# Patient Record
Sex: FEMALE | URBAN - METROPOLITAN AREA
[De-identification: names, ages, dates, MRNs, and addresses within clinical notes are randomized per-mention and may not be internally consistent; named-entity substitution may affect disease eponyms.]

---

## 2020-11-27 ENCOUNTER — OFFICE VISIT (OUTPATIENT)
Dept: URGENT CARE | Facility: CLINIC | Age: 75
End: 2020-11-27
Payer: MEDICARE

## 2020-11-27 VITALS
DIASTOLIC BLOOD PRESSURE: 81 MMHG | HEIGHT: 65 IN | BODY MASS INDEX: 26.99 KG/M2 | HEART RATE: 76 BPM | SYSTOLIC BLOOD PRESSURE: 142 MMHG | WEIGHT: 162 LBS | OXYGEN SATURATION: 98 % | TEMPERATURE: 98.2 F | RESPIRATION RATE: 16 BRPM

## 2020-11-27 DIAGNOSIS — H00.015 HORDEOLUM EXTERNUM OF LEFT LOWER EYELID: Primary | ICD-10-CM

## 2020-11-27 PROCEDURE — 99203 OFFICE O/P NEW LOW 30 MIN: CPT | Performed by: PHYSICIAN ASSISTANT

## 2020-11-27 RX ORDER — POLYMYXIN B SULFATE AND TRIMETHOPRIM 1; 10000 MG/ML; [USP'U]/ML
1 SOLUTION OPHTHALMIC EVERY 4 HOURS
Qty: 10 ML | Refills: 0 | Status: SHIPPED | OUTPATIENT
Start: 2020-11-27

## 2020-11-27 RX ORDER — ESCITALOPRAM OXALATE 20 MG/1
30 TABLET ORAL DAILY
COMMUNITY

## 2021-07-06 ENCOUNTER — APPOINTMENT (OUTPATIENT)
Dept: OPHTHALMOLOGY | Facility: CLINIC | Age: 76
End: 2021-07-06
Payer: MEDICARE

## 2021-07-06 ENCOUNTER — NON-APPOINTMENT (OUTPATIENT)
Age: 76
End: 2021-07-06

## 2021-07-06 PROCEDURE — 92014 COMPRE OPH EXAM EST PT 1/>: CPT

## 2022-10-11 ENCOUNTER — APPOINTMENT (OUTPATIENT)
Dept: OPHTHALMOLOGY | Facility: CLINIC | Age: 77
End: 2022-10-11

## 2022-10-11 ENCOUNTER — NON-APPOINTMENT (OUTPATIENT)
Age: 77
End: 2022-10-11

## 2022-10-11 PROCEDURE — 92014 COMPRE OPH EXAM EST PT 1/>: CPT

## 2023-10-18 ENCOUNTER — APPOINTMENT (OUTPATIENT)
Dept: OPHTHALMOLOGY | Facility: CLINIC | Age: 78
End: 2023-10-18
Payer: MEDICARE

## 2023-10-18 ENCOUNTER — NON-APPOINTMENT (OUTPATIENT)
Age: 78
End: 2023-10-18

## 2023-10-18 PROCEDURE — 99214 OFFICE O/P EST MOD 30 MIN: CPT

## 2023-11-14 ENCOUNTER — NON-APPOINTMENT (OUTPATIENT)
Age: 78
End: 2023-11-14

## 2023-11-14 ENCOUNTER — APPOINTMENT (OUTPATIENT)
Dept: OPHTHALMOLOGY | Facility: CLINIC | Age: 78
End: 2023-11-14
Payer: MEDICARE

## 2023-11-14 PROCEDURE — 92134 CPTRZ OPH DX IMG PST SGM RTA: CPT

## 2023-11-14 PROCEDURE — 99213 OFFICE O/P EST LOW 20 MIN: CPT

## 2023-11-14 PROCEDURE — 92136 OPHTHALMIC BIOMETRY: CPT

## 2023-11-20 ENCOUNTER — APPOINTMENT (OUTPATIENT)
Dept: OPHTHALMOLOGY | Facility: AMBULATORY SURGERY CENTER | Age: 78
End: 2023-11-20
Payer: MEDICARE

## 2023-11-20 ENCOUNTER — NON-APPOINTMENT (OUTPATIENT)
Age: 78
End: 2023-11-20

## 2023-11-20 ENCOUNTER — APPOINTMENT (OUTPATIENT)
Dept: OPHTHALMOLOGY | Facility: CLINIC | Age: 78
End: 2023-11-20

## 2023-11-20 PROCEDURE — 66984 XCAPSL CTRC RMVL W/O ECP: CPT | Mod: RT

## 2023-11-20 PROCEDURE — CL050: CPT

## 2023-11-21 ENCOUNTER — APPOINTMENT (OUTPATIENT)
Dept: OPHTHALMOLOGY | Facility: CLINIC | Age: 78
End: 2023-11-21
Payer: MEDICARE

## 2023-11-21 ENCOUNTER — NON-APPOINTMENT (OUTPATIENT)
Age: 78
End: 2023-11-21

## 2023-11-21 PROCEDURE — 99024 POSTOP FOLLOW-UP VISIT: CPT

## 2023-11-28 ENCOUNTER — APPOINTMENT (OUTPATIENT)
Dept: OPHTHALMOLOGY | Facility: CLINIC | Age: 78
End: 2023-11-28
Payer: MEDICARE

## 2023-11-28 ENCOUNTER — NON-APPOINTMENT (OUTPATIENT)
Age: 78
End: 2023-11-28

## 2023-11-28 PROCEDURE — 92136 OPHTHALMIC BIOMETRY: CPT | Mod: 26,LT

## 2023-11-28 PROCEDURE — 99213 OFFICE O/P EST LOW 20 MIN: CPT | Mod: 24

## 2023-12-05 ENCOUNTER — NON-APPOINTMENT (OUTPATIENT)
Age: 78
End: 2023-12-05

## 2023-12-05 ENCOUNTER — APPOINTMENT (OUTPATIENT)
Dept: OPHTHALMOLOGY | Facility: AMBULATORY SURGERY CENTER | Age: 78
End: 2023-12-05
Payer: MEDICARE

## 2023-12-05 PROCEDURE — CL050: CPT

## 2023-12-05 PROCEDURE — 66984 XCAPSL CTRC RMVL W/O ECP: CPT | Mod: 79,LT

## 2023-12-06 ENCOUNTER — APPOINTMENT (OUTPATIENT)
Dept: OPHTHALMOLOGY | Facility: CLINIC | Age: 78
End: 2023-12-06
Payer: MEDICARE

## 2023-12-06 ENCOUNTER — NON-APPOINTMENT (OUTPATIENT)
Age: 78
End: 2023-12-06

## 2023-12-06 PROCEDURE — 99024 POSTOP FOLLOW-UP VISIT: CPT

## 2023-12-12 ENCOUNTER — NON-APPOINTMENT (OUTPATIENT)
Age: 78
End: 2023-12-12

## 2023-12-12 ENCOUNTER — APPOINTMENT (OUTPATIENT)
Dept: OPHTHALMOLOGY | Facility: CLINIC | Age: 78
End: 2023-12-12
Payer: MEDICARE

## 2023-12-12 PROCEDURE — 99024 POSTOP FOLLOW-UP VISIT: CPT

## 2023-12-18 ENCOUNTER — APPOINTMENT (OUTPATIENT)
Dept: OPHTHALMOLOGY | Facility: CLINIC | Age: 78
End: 2023-12-18

## 2024-01-02 ENCOUNTER — NON-APPOINTMENT (OUTPATIENT)
Age: 79
End: 2024-01-02

## 2024-01-02 ENCOUNTER — APPOINTMENT (OUTPATIENT)
Dept: OPHTHALMOLOGY | Facility: CLINIC | Age: 79
End: 2024-01-02
Payer: MEDICARE

## 2024-01-02 PROCEDURE — 99024 POSTOP FOLLOW-UP VISIT: CPT

## 2024-01-02 PROCEDURE — 66821 AFTER CATARACT LASER SURGERY: CPT | Mod: 78,LT

## 2024-01-08 ENCOUNTER — APPOINTMENT (OUTPATIENT)
Dept: OPHTHALMOLOGY | Facility: CLINIC | Age: 79
End: 2024-01-08
Payer: MEDICARE

## 2024-01-08 ENCOUNTER — NON-APPOINTMENT (OUTPATIENT)
Age: 79
End: 2024-01-08

## 2024-01-08 PROCEDURE — 99024 POSTOP FOLLOW-UP VISIT: CPT

## 2024-01-08 PROCEDURE — ZZZZZ: CPT

## 2024-01-09 ENCOUNTER — APPOINTMENT (OUTPATIENT)
Dept: OPHTHALMOLOGY | Facility: CLINIC | Age: 79
End: 2024-01-09

## 2024-01-15 ENCOUNTER — APPOINTMENT (OUTPATIENT)
Dept: OPHTHALMOLOGY | Facility: CLINIC | Age: 79
End: 2024-01-15
Payer: MEDICARE

## 2024-01-15 ENCOUNTER — NON-APPOINTMENT (OUTPATIENT)
Age: 79
End: 2024-01-15

## 2024-01-15 PROCEDURE — 99024 POSTOP FOLLOW-UP VISIT: CPT

## 2024-01-15 PROCEDURE — ZZZZZ: CPT

## 2024-01-22 ENCOUNTER — APPOINTMENT (OUTPATIENT)
Dept: OPHTHALMOLOGY | Facility: CLINIC | Age: 79
End: 2024-01-22
Payer: MEDICARE

## 2024-01-22 ENCOUNTER — NON-APPOINTMENT (OUTPATIENT)
Age: 79
End: 2024-01-22

## 2024-01-22 PROCEDURE — 99024 POSTOP FOLLOW-UP VISIT: CPT

## 2024-01-22 PROCEDURE — ZZZZZ: CPT

## 2024-01-29 ENCOUNTER — NON-APPOINTMENT (OUTPATIENT)
Age: 79
End: 2024-01-29

## 2024-01-29 ENCOUNTER — APPOINTMENT (OUTPATIENT)
Dept: OPHTHALMOLOGY | Facility: CLINIC | Age: 79
End: 2024-01-29
Payer: MEDICARE

## 2024-01-29 PROCEDURE — 99024 POSTOP FOLLOW-UP VISIT: CPT

## 2024-01-29 PROCEDURE — ZZZZZ: CPT

## 2024-02-08 ENCOUNTER — APPOINTMENT (OUTPATIENT)
Dept: OPHTHALMOLOGY | Facility: CLINIC | Age: 79
End: 2024-02-08
Payer: MEDICARE

## 2024-02-08 ENCOUNTER — NON-APPOINTMENT (OUTPATIENT)
Age: 79
End: 2024-02-08

## 2024-02-08 PROCEDURE — ZZZZZ: CPT

## 2024-02-08 PROCEDURE — 99024 POSTOP FOLLOW-UP VISIT: CPT

## 2024-02-15 ENCOUNTER — NON-APPOINTMENT (OUTPATIENT)
Age: 79
End: 2024-02-15

## 2024-02-15 ENCOUNTER — APPOINTMENT (OUTPATIENT)
Dept: OPHTHALMOLOGY | Facility: CLINIC | Age: 79
End: 2024-02-15
Payer: MEDICARE

## 2024-02-15 PROCEDURE — 99024 POSTOP FOLLOW-UP VISIT: CPT

## 2024-02-15 PROCEDURE — ZZZZZ: CPT

## 2024-05-22 ENCOUNTER — NON-APPOINTMENT (OUTPATIENT)
Age: 79
End: 2024-05-22

## 2024-05-22 ENCOUNTER — APPOINTMENT (OUTPATIENT)
Dept: OPHTHALMOLOGY | Facility: CLINIC | Age: 79
End: 2024-05-22
Payer: MEDICARE

## 2024-05-22 PROCEDURE — 92014 COMPRE OPH EXAM EST PT 1/>: CPT

## 2024-08-24 ENCOUNTER — OFFICE VISIT (OUTPATIENT)
Dept: URGENT CARE | Facility: CLINIC | Age: 79
End: 2024-08-24
Payer: MEDICARE

## 2024-08-24 VITALS
HEIGHT: 65 IN | HEART RATE: 76 BPM | DIASTOLIC BLOOD PRESSURE: 70 MMHG | WEIGHT: 152.8 LBS | BODY MASS INDEX: 25.46 KG/M2 | OXYGEN SATURATION: 97 % | TEMPERATURE: 98.3 F | SYSTOLIC BLOOD PRESSURE: 104 MMHG | RESPIRATION RATE: 18 BRPM

## 2024-08-24 DIAGNOSIS — R30.0 DYSURIA: Primary | ICD-10-CM

## 2024-08-24 LAB
SL AMB POCT CLARITY,UA: ABNORMAL
SL AMB POCT COLOR,UA: ABNORMAL

## 2024-08-24 PROCEDURE — 87086 URINE CULTURE/COLONY COUNT: CPT | Performed by: PHYSICIAN ASSISTANT

## 2024-08-24 PROCEDURE — 81002 URINALYSIS NONAUTO W/O SCOPE: CPT | Performed by: PHYSICIAN ASSISTANT

## 2024-08-24 PROCEDURE — G2211 COMPLEX E/M VISIT ADD ON: HCPCS | Performed by: PHYSICIAN ASSISTANT

## 2024-08-24 PROCEDURE — 99213 OFFICE O/P EST LOW 20 MIN: CPT | Performed by: PHYSICIAN ASSISTANT

## 2024-08-24 RX ORDER — ACETAMINOPHEN 160 MG
2000 TABLET,DISINTEGRATING ORAL DAILY
COMMUNITY

## 2024-08-24 RX ORDER — ASPIRIN 81 MG/1
81 TABLET ORAL DAILY
COMMUNITY

## 2024-08-24 RX ORDER — CEPHALEXIN 500 MG/1
500 CAPSULE ORAL EVERY 12 HOURS SCHEDULED
Qty: 14 CAPSULE | Refills: 0 | Status: SHIPPED | OUTPATIENT
Start: 2024-08-24 | End: 2024-08-31

## 2024-08-24 RX ORDER — CETIRIZINE HYDROCHLORIDE 10 MG/1
10 TABLET ORAL DAILY
COMMUNITY

## 2024-08-24 NOTE — PATIENT INSTRUCTIONS
"Dysuria:   -The patients hx and sx are most consistent with an acute uncomplicated UTI. Will treat with Keflex taken as prescribed. Take with food and a probiotic. She has no fever, chills, body aches. No flank pain or CVA tenderness as per patient.  -urine cx ordered today. Call in 48 hours for your results.   -We discussed Uqora supplements or D-Mannose for bladder health and prevention   -Warm heating pad on the abdomen or sitz bath for comfort  -Avoid bubble bath/bath oils. Caffeine and alcohol may irritate the bladder.   -Empty bladder immediately before and following intercourse. Avoid \"holding urine.\"  -AZO/Uricalm for pain control, do not take for more than 48 hours as this can mask worsening symptoms.   -Stay VERY well hydrated and push fluids. You want your urine clear.   -Prevention and precautions discussed  -If your sx worsen or persists, see your PCP or OBGYN immediately as discussed. Red flag signs discussed in depth.       "

## 2024-08-24 NOTE — PROGRESS NOTES
"  [facility]        NAME: Shanita Villegas is a 79 y.o. female  : 1945    MRN: [id number]  DATE: 2024  TIME: 11:16 AM    Assessment and Plan   Dysuria [R30.0]  1. Dysuria  POCT urine dip    Urine culture    cephalexin (KEFLEX) 500 mg capsule            Patient Instructions   Dysuria:   -The patients hx and sx are most consistent with an acute uncomplicated UTI. Will treat with Keflex taken as prescribed. Take with food and a probiotic. She has no fever, chills, body aches. No flank pain or CVA tenderness as per patient.  -urine cx ordered today. Call in 48 hours for your results.   -We discussed Uqora supplements or D-Mannose for bladder health and prevention   -Warm heating pad on the abdomen or sitz bath for comfort  -Avoid bubble bath/bath oils. Caffeine and alcohol may irritate the bladder.   -Empty bladder immediately before and following intercourse. Avoid \"holding urine.\"  -AZO/Uricalm for pain control, do not take for more than 48 hours as this can mask worsening symptoms.   -Stay VERY well hydrated and push fluids. You want your urine clear.   -Prevention and precautions discussed  -If your sx worsen or persists, see your PCP or OBGYN immediately as discussed. Red flag signs discussed in depth.         Follow up with PCP in 3-5 days.  Proceed to  ER if symptoms worsen.    If tests have been performed at [facility], our office will contact you with results if changes need to be made to the care plan discussed with you at the visit.  You can review your full results on [facility] MyChart.    Chief Complaint     Chief Complaint   Patient presents with    Possible UTI     X 3 days - dysuria, urgency and frequency. Denies fever, abd. Or back pain or N/V. Took AZO yesterday.         History of Present Illness       The patient is a 79-year-old female who presents today for a three day hx of dysuria, urgency, frequency x 3 days.  She has no fever, chills, body aches. No nausea, vomiting, " diarrhea. No flank pain, abdominal pain, pelvic pain. No hematuria. No abnormal vaginal bleeding or discharge. No vaginal pruritis or irritation. She took a dose of AZO yesterday.         Review of Systems   Review of Systems   Constitutional:  Negative for activity change, appetite change, chills, fatigue and fever.   Respiratory:  Negative for cough, chest tightness, shortness of breath and wheezing.    Cardiovascular:  Negative for chest pain and palpitations.   Gastrointestinal:  Negative for abdominal distention, abdominal pain, blood in stool, constipation, diarrhea, nausea and vomiting.   Genitourinary:  Positive for dysuria, frequency and urgency. Negative for decreased urine volume, difficulty urinating, dyspareunia, flank pain, hematuria, menstrual problem, pelvic pain, vaginal bleeding, vaginal discharge and vaginal pain.   Musculoskeletal:  Negative for arthralgias and myalgias.   Skin:  Negative for rash.   Hematological:  Negative for adenopathy. Does not bruise/bleed easily.         Current Medications       Current Outpatient Medications:     aspirin (Aspirin 81) 81 mg EC tablet, Take 81 mg by mouth daily, Disp: , Rfl:     calcium carbonate-vitamin D 250 mg-3.125 mcg per tablet, Take 1 tablet by mouth daily, Disp: , Rfl:     cephalexin (KEFLEX) 500 mg capsule, Take 1 capsule (500 mg total) by mouth every 12 (twelve) hours for 7 days, Disp: 14 capsule, Rfl: 0    cetirizine (ZyrTEC) 10 mg tablet, Take 10 mg by mouth daily, Disp: , Rfl:     Cholecalciferol (Vitamin D3) 50 MCG (2000 UT) capsule, Take 2,000 Units by mouth daily, Disp: , Rfl:     DILTIAZEM HCL ER PO, Take 240 mg by mouth, Disp: , Rfl:     omeprazole (PriLOSEC) 20 mg delayed release capsule, Take 20 mg by mouth daily, Disp: , Rfl:     Current Allergies     Allergies as of 08/24/2024    (No Known Allergies)            The following portions of the patient's history were reviewed and updated as appropriate: allergies, current medications,  "past family history, past medical history, past social history, past surgical history and problem list.     Past Medical History:   Diagnosis Date    Allergic rhinitis     GERD (gastroesophageal reflux disease)     Hypertension     Urinary tract infection        Past Surgical History:   Procedure Laterality Date    BLADDER REPAIR      BREAST LUMPECTOMY Right     benign    JOINT REPLACEMENT Right     TKR       No family history on file.      Medications have been verified.        Objective   /70   Pulse 76   Temp 98.3 °F (36.8 °C)   Resp 18   Ht 5' 5\" (1.651 m)   Wt 69.3 kg (152 lb 12.8 oz)   SpO2 97%   BMI 25.43 kg/m²   No LMP recorded. Patient is postmenopausal.       Physical Exam     Physical Exam  Vitals and nursing note reviewed.   Constitutional:       General: She is not in acute distress.     Appearance: Normal appearance. She is well-developed. She is not ill-appearing, toxic-appearing or diaphoretic.   Cardiovascular:      Rate and Rhythm: Normal rate and regular rhythm.      Heart sounds: Normal heart sounds.   Pulmonary:      Effort: Pulmonary effort is normal.      Breath sounds: Normal breath sounds.   Abdominal:      General: Bowel sounds are normal. There is no distension.      Palpations: Abdomen is soft. Abdomen is not rigid.      Tenderness: There is no abdominal tenderness. There is no right CVA tenderness, left CVA tenderness, guarding or rebound. Negative signs include Guerrero's sign and McBurney's sign.      Hernia: No hernia is present.   Skin:     General: Skin is warm and dry.      Capillary Refill: Capillary refill takes less than 2 seconds.   Psychiatric:         Behavior: Behavior normal.                   "

## 2024-08-26 LAB — BACTERIA UR CULT: NORMAL

## 2024-12-04 ENCOUNTER — OFFICE VISIT (OUTPATIENT)
Dept: FAMILY MEDICINE CLINIC | Facility: CLINIC | Age: 79
End: 2024-12-04
Payer: MEDICARE

## 2024-12-04 VITALS
TEMPERATURE: 97.6 F | BODY MASS INDEX: 25.33 KG/M2 | RESPIRATION RATE: 18 BRPM | SYSTOLIC BLOOD PRESSURE: 130 MMHG | HEIGHT: 65 IN | WEIGHT: 152 LBS | DIASTOLIC BLOOD PRESSURE: 80 MMHG | HEART RATE: 80 BPM

## 2024-12-04 DIAGNOSIS — I10 PRIMARY HYPERTENSION: ICD-10-CM

## 2024-12-04 DIAGNOSIS — Z00.00 MEDICARE ANNUAL WELLNESS VISIT, SUBSEQUENT: Primary | ICD-10-CM

## 2024-12-04 DIAGNOSIS — Z12.39 SCREENING BREAST EXAMINATION: ICD-10-CM

## 2024-12-04 DIAGNOSIS — Z12.31 ENCOUNTER FOR SCREENING MAMMOGRAM FOR MALIGNANT NEOPLASM OF BREAST: ICD-10-CM

## 2024-12-04 DIAGNOSIS — E78.2 MIXED HYPERLIPIDEMIA: ICD-10-CM

## 2024-12-04 DIAGNOSIS — D72.820 LYMPHOCYTOSIS: ICD-10-CM

## 2024-12-04 DIAGNOSIS — R73.09 ELEVATED GLUCOSE: ICD-10-CM

## 2024-12-04 DIAGNOSIS — Z11.59 ENCOUNTER FOR HEPATITIS C SCREENING TEST FOR LOW RISK PATIENT: ICD-10-CM

## 2024-12-04 DIAGNOSIS — K21.9 GASTROESOPHAGEAL REFLUX DISEASE WITHOUT ESOPHAGITIS: ICD-10-CM

## 2024-12-04 DIAGNOSIS — M70.61 TROCHANTERIC BURSITIS OF RIGHT HIP: ICD-10-CM

## 2024-12-04 DIAGNOSIS — G89.29 CHRONIC PAIN OF RIGHT KNEE: ICD-10-CM

## 2024-12-04 DIAGNOSIS — M25.561 CHRONIC PAIN OF RIGHT KNEE: ICD-10-CM

## 2024-12-04 PROCEDURE — G0439 PPPS, SUBSEQ VISIT: HCPCS | Performed by: INTERNAL MEDICINE

## 2024-12-04 PROCEDURE — 99205 OFFICE O/P NEW HI 60 MIN: CPT | Performed by: INTERNAL MEDICINE

## 2024-12-04 RX ORDER — ROSUVASTATIN CALCIUM 5 MG/1
5 TABLET, COATED ORAL DAILY
Qty: 90 TABLET | Refills: 3 | Status: SHIPPED | OUTPATIENT
Start: 2024-12-04

## 2024-12-04 RX ORDER — ROSUVASTATIN CALCIUM 5 MG/1
5 TABLET, COATED ORAL DAILY
COMMUNITY
End: 2024-12-04 | Stop reason: SDUPTHER

## 2024-12-04 RX ORDER — CYANOCOBALAMIN (VITAMIN B-12) 5000 MCG
TABLET,DISINTEGRATING ORAL
COMMUNITY

## 2024-12-04 NOTE — ASSESSMENT & PLAN NOTE
Will check labs for lipids and LFT's, continue rosuvastatin and encouraged low fat diet.   Orders:    CBC and differential; Future    Comprehensive metabolic panel; Future    Lipid panel; Future    rosuvastatin (CRESTOR) 5 mg tablet; Take 1 tablet (5 mg total) by mouth daily

## 2024-12-04 NOTE — PROGRESS NOTES
Name: Shanita Villegas      : 1945      MRN: [id number]  Encounter Provider: Latonya Sauer MD  Encounter Date: 2024   Encounter department: [facility]    Assessment & Plan  Medicare annual wellness visit, subsequent    Orders:    Ambulatory Referral to Optometry; Future    Lymphocytosis  Chronic and stable per patient, will recheck cbc with diff to evaluate.        Gastroesophageal reflux disease without esophagitis  Has longstanding history of GERD and is due for colonoscopy, will refer to GI.  Orders:    Ambulatory Referral to Gastroenterology; Future    Primary hypertension  Well controlled on current dose of diltiazem and will continue as ordered.     Orders:    CBC and differential; Future    Comprehensive metabolic panel; Future    Lipid panel; Future    Mixed hyperlipidemia  Will check labs for lipids and LFT's, continue rosuvastatin and encouraged low fat diet.   Orders:    CBC and differential; Future    Comprehensive metabolic panel; Future    Lipid panel; Future    rosuvastatin (CRESTOR) 5 mg tablet; Take 1 tablet (5 mg total) by mouth daily    Trochanteric bursitis of right hip    Orders:    Ambulatory referral to Orthopedic Surgery; Future    Chronic pain of right knee    Orders:    Ambulatory referral to Orthopedic Surgery; Future    Elevated glucose    Orders:    Hemoglobin A1C; Future    Encounter for hepatitis C screening test for low risk patient    Orders:    Hepatitis C antibody; Future    Encounter for screening mammogram for malignant neoplasm of breast    Orders:    Mammo screening bilateral w 3d and cad; Future    Screening breast examination    Orders:    Mammo screening bilateral w 3d and cad; Future       Preventive health issues were discussed with patient, and age appropriate screening tests were ordered as noted in patient's After Visit Summary. Personalized health advice and appropriate referrals for health education or preventive services given if needed,  as noted in patient's After Visit Summary.    History of Present Illness     NP, here to establish and for AWV.  Recently moved from [place] to [facility] to be closer to her daughter.   Has history of lymphocytosis, previously followed by a hematologist in [place].  Saw hematologist last in July.  He told her that things were stable and she would need to have this monitored, either at her GP or at hematology. Her mother had history of CLL, so this is a concern to her. She denies night sweats or weight loss.  Has history of hypertension and hyperlipidemia.  Brings in a copy of last labwork with her.  No side effects with medications.  Does have history of CAD in father and stroke in brother.  Sister recently passed from liver cancer.   She has a history of right trochanteric bursitis and knee pain s/p TKR and would like a referral to orthopedics.        Patient Care Team:  Latonya Sauer MD as PCP - General (Internal Medicine)    Review of Systems   Constitutional:  Negative for chills, fever and unexpected weight change.   HENT:  Negative for ear pain and sore throat.    Eyes:  Negative for pain and visual disturbance.   Respiratory:  Negative for cough and shortness of breath.    Cardiovascular:  Negative for chest pain and palpitations.   Gastrointestinal:  Positive for abdominal pain. Negative for vomiting.   Genitourinary:  Negative for dysuria and hematuria.   Musculoskeletal:  Positive for arthralgias. Negative for back pain and joint swelling.   Skin:  Negative for color change and rash.   Neurological:  Negative for seizures and syncope.   All other systems reviewed and are negative.    Medical History Reviewed by provider this encounter:       Annual Wellness Visit Questionnaire   Shanita is here for her Subsequent Wellness visit.     Health Risk Assessment:   Patient rates overall health as very good. Patient feels that their physical health rating is same. Patient is very satisfied with  their life. Eyesight was rated as same. Hearing was rated as same. Patient feels that their emotional and mental health rating is slightly better. Patients states they are never, rarely angry. Patient states they are sometimes unusually tired/fatigued. Pain experienced in the last 7 days has been some. Patient's pain rating has been 5/10. Patient states that she has experienced weight loss or gain in last 6 months.     Depression Screening:   PHQ-2 Score: 0      Fall Risk Screening:   In the past year, patient has experienced: no history of falling in past year      Urinary Incontinence Screening:   Patient has leaked urine accidently in the last six months.     Home Safety:  Patient has trouble with stairs inside or outside of their home. Patient has working smoke alarms and has working carbon monoxide detector. Home safety hazards include: none.     Nutrition:   Current diet is Regular.     Medications:   Patient is currently taking over-the-counter supplements. OTC medications include: see medication list. Patient is able to manage medications.     Activities of Daily Living (ADLs)/Instrumental Activities of Daily Living (IADLs):   Walk and transfer into and out of bed and chair?: Yes  Dress and groom yourself?: Yes    Bathe or shower yourself?: Yes    Feed yourself? Yes  Do your laundry/housekeeping?: Yes  Manage your money, pay your bills and track your expenses?: Yes  Make your own meals?: Yes    Do your own shopping?: Yes    Previous Hospitalizations:   Any hospitalizations or ED visits within the last 12 months?: No      Advance Care Planning:   Living will: Yes    Durable POA for healthcare: Yes    Advanced directive: Yes    End of Life Decisions reviewed with patient: Yes      Cognitive Screening:   Provider or family/friend/caregiver concerned regarding cognition?: No    PREVENTIVE SCREENINGS      Cardiovascular Screening:    General: Screening Not Indicated, History Lipid Disorder and Risks and Benefits  Discussed    Due for: Lipid Panel      Diabetes Screening:     General: Risks and Benefits Discussed    Due for: Blood Glucose      Colorectal Cancer Screening:     General: Risks and Benefits Discussed    Due for: Colonoscopy - Low Risk      Breast Cancer Screening:     General: Risks and Benefits Discussed    Due for: Mammogram        Cervical Cancer Screening:    General: Screening Not Indicated      Osteoporosis Screening:    General: Patient Declines      Abdominal Aortic Aneurysm (AAA) Screening:        General: Screening Not Indicated      Lung Cancer Screening:     General: Screening Not Indicated      Hepatitis C Screening:    General: Risks and Benefits Discussed    Hep C Screening Accepted: Yes      Screening, Brief Intervention, and Referral to Treatment (SBIRT)    Screening  Typical number of drinks in a day: 0  Typical number of drinks in a week: 4  Interpretation: Low risk drinking behavior.    AUDIT-C Screenin) How often did you have a drink containing alcohol in the past year? 2 to 3 times a week  2) How many drinks did you have on a typical day when you were drinking in the past year? 1 to 2  3) How often did you have 6 or more drinks on one occasion in the past year? never    AUDIT-C Score: 3  Interpretation: Score 3-12 (female): POSITIVE screen for alcohol misuse    AUDIT Screenin) How often during the last year have you found that you were not able to stop drinking once you had started? 0 - never  5) How often during the last year have you failed to do what was normally expected from you because of drinking? 0 - never  6) How often during the last year have you needed a first drink in the morning to get yourself going after a heavy drinking session? 0 - never  7) How often during the last year have you had a feeling of guilt or remorse after drinking? 0 - never  8) How often during the last year have you been unable to remember what happened the night before because you had been  "drinking? 0 - never  9) Have you or someone else been injured as a result of your drinking? 0 - no  10) Has a relative or friend or a doctor or another health worker been concerned about your drinking or suggested you cut down? 0 - no    AUDIT Score: 3  Interpretation: Low risk alcohol consumption    Single Item Drug Screening:  How often have you used an illegal drug (including marijuana) or a prescription medication for non-medical reasons in the past year? never    Single Item Drug Screen Score: 0  Interpretation: Negative screen for possible drug use disorder    Brief Intervention  Alcohol & drug use screenings were reviewed. No concerns regarding substance use disorder identified. Healthy alcohol use/limits discussed.     Other Counseling Topics:   Car/seat belt/driving safety, skin self-exam, sunscreen and calcium and vitamin D intake and regular weightbearing exercise.        No results found.    Objective   /80   Pulse 80   Temp 97.6 °F (36.4 °C)   Resp 18   Ht 5' 5\" (1.651 m)   Wt 68.9 kg (152 lb)   BMI 25.29 kg/m²     Physical Exam  Constitutional:       General: She is not in acute distress.     Appearance: She is well-developed. She is not diaphoretic.   HENT:      Head: Normocephalic and atraumatic.      Right Ear: External ear normal.      Left Ear: External ear normal.      Nose: Nose normal.   Eyes:      Pupils: Pupils are equal, round, and reactive to light.   Neck:      Thyroid: No thyromegaly.      Vascular: No JVD.   Cardiovascular:      Rate and Rhythm: Regular rhythm.      Heart sounds: No murmur heard.     No friction rub. No gallop.   Pulmonary:      Effort: Pulmonary effort is normal.      Breath sounds: Normal breath sounds. No wheezing or rales.   Abdominal:      General: Bowel sounds are normal. There is no distension.      Palpations: Abdomen is soft.      Tenderness: There is no abdominal tenderness.   Musculoskeletal:         General: Normal range of motion.      Cervical " back: Normal range of motion and neck supple.   Skin:     General: Skin is warm and dry.      Findings: No rash.   Neurological:      Mental Status: She is alert and oriented to person, place, and time.      Cranial Nerves: No cranial nerve deficit.      Sensory: No sensory deficit.      Motor: No abnormal muscle tone.      Coordination: Coordination normal.      Deep Tendon Reflexes: Reflexes normal.   Psychiatric:         Behavior: Behavior normal.         Thought Content: Thought content normal.         Judgment: Judgment normal.

## 2024-12-04 NOTE — PATIENT INSTRUCTIONS
Medicare Preventive Visit Patient Instructions  Thank you for completing your Welcome to Medicare Visit or Medicare Annual Wellness Visit today. Your next wellness visit will be due in one year (12/5/2025).  The screening/preventive services that you may require over the next 5-10 years are detailed below. Some tests may not apply to you based off risk factors and/or age. Screening tests ordered at today's visit but not completed yet may show as past due. Also, please note that scanned in results may not display below.  Preventive Screenings:  Service Recommendations Previous Testing/Comments   Colorectal Cancer Screening  * Colonoscopy    * Fecal Occult Blood Test (FOBT)/Fecal Immunochemical Test (FIT)  * Fecal DNA/Cologuard Test  * Flexible Sigmoidoscopy Age: 45-75 years old   Colonoscopy: every 10 years (may be performed more frequently if at higher risk)  OR  FOBT/FIT: every 1 year  OR  Cologuard: every 3 years  OR  Sigmoidoscopy: every 5 years  Screening may be recommended earlier than age 45 if at higher risk for colorectal cancer. Also, an individualized decision between you and your healthcare provider will decide whether screening between the ages of 76-85 would be appropriate. Colonoscopy: Not on file  FOBT/FIT: Not on file  Cologuard: Not on file  Sigmoidoscopy: Not on file          Breast Cancer Screening Age: 40+ years old  Frequency: every 1-2 years  Not required if history of left and right mastectomy Mammogram: 10/07/2022        Cervical Cancer Screening Between the ages of 21-29, pap smear recommended once every 3 years.   Between the ages of 30-65, can perform pap smear with HPV co-testing every 5 years.   Recommendations may differ for women with a history of total hysterectomy, cervical cancer, or abnormal pap smears in past. Pap Smear: Not on file    Screening Not Indicated   Hepatitis C Screening Once for adults born between 1945 and 1965  More frequently in patients at high risk for Hepatitis  C Hep C Antibody: Not on file        Diabetes Screening 1-2 times per year if you're at risk for diabetes or have pre-diabetes Fasting glucose: No results in last 5 years (No results in last 5 years)  A1C: No results in last 5 years (No results in last 5 years)      Cholesterol Screening Once every 5 years if you don't have a lipid disorder. May order more often based on risk factors. Lipid panel: Not on file          Other Preventive Screenings Covered by Medicare:  Abdominal Aortic Aneurysm (AAA) Screening: covered once if your at risk. You're considered to be at risk if you have a family history of AAA.  Lung Cancer Screening: covers low dose CT scan once per year if you meet all of the following conditions: (1) Age 55-77; (2) No signs or symptoms of lung cancer; (3) Current smoker or have quit smoking within the last 15 years; (4) You have a tobacco smoking history of at least 20 pack years (packs per day multiplied by number of years you smoked); (5) You get a written order from a healthcare provider.  Glaucoma Screening: covered annually if you're considered high risk: (1) You have diabetes OR (2) Family history of glaucoma OR (3)  aged 50 and older OR (4)  American aged 65 and older  Osteoporosis Screening: covered every 2 years if you meet one of the following conditions: (1) You're estrogen deficient and at risk for osteoporosis based off medical history and other findings; (2) Have a vertebral abnormality; (3) On glucocorticoid therapy for more than 3 months; (4) Have primary hyperparathyroidism; (5) On osteoporosis medications and need to assess response to drug therapy.   Last bone density test (DXA Scan): Not on file.  HIV Screening: covered annually if you're between the age of 15-65. Also covered annually if you are younger than 15 and older than 65 with risk factors for HIV infection. For pregnant patients, it is covered up to 3 times per  pregnancy.    Immunizations:  Immunization Recommendations   Influenza Vaccine Annual influenza vaccination during flu season is recommended for all persons aged >= 6 months who do not have contraindications   Pneumococcal Vaccine   * Pneumococcal conjugate vaccine = PCV13 (Prevnar 13), PCV15 (Vaxneuvance), PCV20 (Prevnar 20)  * Pneumococcal polysaccharide vaccine = PPSV23 (Pneumovax) Adults 19-65 yo with certain risk factors or if 65+ yo  If never received any pneumonia vaccine: recommend Prevnar 20 (PCV20)  Give PCV20 if previously received 1 dose of PCV13 or PPSV23   Hepatitis B Vaccine 3 dose series if at intermediate or high risk (ex: diabetes, end stage renal disease, liver disease)   Respiratory syncytial virus (RSV) Vaccine - COVERED BY MEDICARE PART D  * RSVPreF3 (Arexvy) CDC recommends that adults 60 years of age and older may receive a single dose of RSV vaccine using shared clinical decision-making (SCDM)   Tetanus (Td) Vaccine - COST NOT COVERED BY MEDICARE PART B Following completion of primary series, a booster dose should be given every 10 years to maintain immunity against tetanus. Td may also be given as tetanus wound prophylaxis.   Tdap Vaccine - COST NOT COVERED BY MEDICARE PART B Recommended at least once for all adults. For pregnant patients, recommended with each pregnancy.   Shingles Vaccine (Shingrix) - COST NOT COVERED BY MEDICARE PART B  2 shot series recommended in those 19 years and older who have or will have weakened immune systems or those 50 years and older     Health Maintenance Due:      Topic Date Due   • Hepatitis C Screening  Never done   • Breast Cancer Screening: Mammogram  10/07/2023     Immunizations Due:      Topic Date Due   • Pneumococcal Vaccine: 65+ Years (2 of 2 - PCV) 02/06/2024   • Influenza Vaccine (1) 09/01/2024   • COVID-19 Vaccine (5 - 2024-25 season) 09/01/2024     Advance Directives   What are advance directives?  Advance directives are legal documents that  state your wishes and plans for medical care. These plans are made ahead of time in case you lose your ability to make decisions for yourself. Advance directives can apply to any medical decision, such as the treatments you want, and if you want to donate organs.   What are the types of advance directives?  There are many types of advance directives, and each state has rules about how to use them. You may choose a combination of any of the following:  Living will:  This is a written record of the treatment you want. You can also choose which treatments you do not want, which to limit, and which to stop at a certain time. This includes surgery, medicine, IV fluid, and tube feedings.   Durable power of  for healthcare (DPAHC):  This is a written record that states who you want to make healthcare choices for you when you are unable to make them for yourself. This person, called a proxy, is usually a family member or a friend. You may choose more than 1 proxy.  Do not resuscitate (DNR) order:  A DNR order is used in case your heart stops beating or you stop breathing. It is a request not to have certain forms of treatment, such as CPR. A DNR order may be included in other types of advance directives.  Medical directive:  This covers the care that you want if you are in a coma, near death, or unable to make decisions for yourself. You can list the treatments you want for each condition. Treatment may include pain medicine, surgery, blood transfusions, dialysis, IV or tube feedings, and a ventilator (breathing machine).  Values history:  This document has questions about your views, beliefs, and how you feel and think about life. This information can help others choose the care that you would choose.  Why are advance directives important?  An advance directive helps you control your care. Although spoken wishes may be used, it is better to have your wishes written down. Spoken wishes can be misunderstood, or not  followed. Treatments may be given even if you do not want them. An advance directive may make it easier for your family to make difficult choices about your care.   Urinary Incontinence   Urinary incontinence (UI)  is when you lose control of your bladder. UI develops because your bladder cannot store or empty urine properly. The 3 most common types of UI are stress incontinence, urge incontinence, or both.  Medicines:   May be given to help strengthen your bladder control. Report any side effects of medication to your healthcare provider.  Do pelvic muscle exercises often:  Your pelvic muscles help you stop urinating. Squeeze these muscles tight for 5 seconds, then relax for 5 seconds. Gradually work up to squeezing for 10 seconds. Do 3 sets of 15 repetitions a day, or as directed. This will help strengthen your pelvic muscles and improve bladder control.  Train your bladder:  Go to the bathroom at set times, such as every 2 hours, even if you do not feel the urge to go. You can also try to hold your urine when you feel the urge to go. For example, hold your urine for 5 minutes when you feel the urge to go. As that becomes easier, hold your urine for 10 minutes.   Self-care:   Keep a UI record.  Write down how often you leak urine and how much you leak. Make a note of what you were doing when you leaked urine.  Drink liquids as directed. You may need to limit the amount of liquid you drink to help control your urine leakage. Do not drink any liquid right before you go to bed. Limit or do not have drinks that contain caffeine or alcohol.   Prevent constipation.  Eat a variety of high-fiber foods. Good examples are high-fiber cereals, beans, vegetables, and whole-grain breads. Walking is the best way to trigger your intestines to have a bowel movement.  Exercise regularly and maintain a healthy weight.  Weight loss and exercise will decrease pressure on your bladder and help you control your leakage.   Use a catheter  as directed  to help empty your bladder. A catheter is a tiny, plastic tube that is put into your bladder to drain your urine.   Go to behavior therapy as directed.  Behavior therapy may be used to help you learn to control your urge to urinate.    Weight Management   Why it is important to manage your weight:  Being overweight increases your risk of health conditions such as heart disease, high blood pressure, type 2 diabetes, and certain types of cancer. It can also increase your risk for osteoarthritis, sleep apnea, and other respiratory problems. Aim for a slow, steady weight loss. Even a small amount of weight loss can lower your risk of health problems.  How to lose weight safely:  A safe and healthy way to lose weight is to eat fewer calories and get regular exercise. You can lose up about 1 pound a week by decreasing the number of calories you eat by 500 calories each day.   Healthy meal plan for weight management:  A healthy meal plan includes a variety of foods, contains fewer calories, and helps you stay healthy. A healthy meal plan includes the following:  Eat whole-grain foods more often.  A healthy meal plan should contain fiber. Fiber is the part of grains, fruits, and vegetables that is not broken down by your body. Whole-grain foods are healthy and provide extra fiber in your diet. Some examples of whole-grain foods are whole-wheat breads and pastas, oatmeal, brown rice, and bulgur.  Eat a variety of vegetables every day.  Include dark, leafy greens such as spinach, kale, shay greens, and mustard greens. Eat yellow and orange vegetables such as carrots, sweet potatoes, and winter squash.   Eat a variety of fruits every day.  Choose fresh or canned fruit (canned in its own juice or light syrup) instead of juice. Fruit juice has very little or no fiber.  Eat low-fat dairy foods.  Drink fat-free (skim) milk or 1% milk. Eat fat-free yogurt and low-fat cottage cheese. Try low-fat cheeses such as  "mozzarella and other reduced-fat cheeses.  Choose meat and other protein foods that are low in fat.  Choose beans or other legumes such as split peas or lentils. Choose fish, skinless poultry (chicken or turkey), or lean cuts of red meat (beef or pork). Before you cook meat or poultry, cut off any visible fat.   Use less fat and oil.  Try baking foods instead of frying them. Add less fat, such as margarine, sour cream, regular salad dressing and mayonnaise to foods. Eat fewer high-fat foods. Some examples of high-fat foods include french fries, doughnuts, ice cream, and cakes.  Eat fewer sweets.  Limit foods and drinks that are high in sugar. This includes candy, cookies, regular soda, and sweetened drinks.  Exercise:  Exercise at least 30 minutes per day on most days of the week. Some examples of exercise include walking, biking, dancing, and swimming. You can also fit in more physical activity by taking the stairs instead of the elevator or parking farther away from stores. Ask your healthcare provider about the best exercise plan for you.   Alcohol Use and Your Health    Drinking too much can harm your health.  Excessive alcohol use leads to about 88,000 death in the [place] each year, and shortens the life of those who diet by almost 30 years.  Further, excessive drinking cost the economy $249 billion in 2010.  Most excessive drinkers are not alcohol dependent.    Excessive alcohol use has immediate effects that increase the risk of many harmful health conditions.  These are most often the result of binge drinking.  Over time, excessive alcohol use can lead to the development of chronic diseases and other series health problems.    What is considered a \"drink\"?        Excessive alcohol use includes:  Binge Drinking: For women, 4 or more drinks consumed on one occasion. For men, 5 or more drinks consumed on one occasion.  Heavy Drinking: For women, 8 or more drinks per week. For men, 15 or more drinks per " week  Any alcohol used by pregnant women  Any alcohol used by those under the age of 21 years    If you choose to drink, do so in moderation:  Do not drink at all if you are under the age of 21, or if you are or may be pregnant, or have health problems that could be made worse by drinking.  For women, up to 1 drink per day  For men, up to 2 drinks a day    No one should begin drinking or drink more frequently based on potential health benefits    Short-Term Health Risks:  Injuries: motor vehicle crashes, falls, drownings, burns  Violence: homicide, suicide, sexual assault, intimate partner violence  Alcohol poisoning  Reproductive health: risky sexual behaviors, unintended prengnacy, sexually transmitted diseases, miscarriage, stillbirth, fetal alcohol syndrome    Long-Term Health Risks:  Chronic diseases: high blood pressure, heart disease, stroke, liver disease, digestive problems  Cancers: breast, mouth and throat, liver, colon  Learning and memory problems: dementia, poor school performance  Mental health: depression, anxiety, insomnia  Social problems: lost productivity, family problems, unemployment  Alcohol dependence    For support and more information:  Substance Abuse and Mental Health Services Administration  [address]  [place] [address]  Web Address: http://www.samhsa.gov    Alcoholics Anonymous        Web Address: http://www.aa.org    https://www.cdc.gov/alcohol/fact-sheets/alcohol-use.htm     © Copyright [employer] 2018 Information is for End User's use only and may not be sold, redistributed or otherwise used for commercial purposes. All illustrations and images included in CareNotes® are the copyrighted property of [employer]., Inc. or [employer]

## 2024-12-04 NOTE — ASSESSMENT & PLAN NOTE
Well controlled on current dose of diltiazem and will continue as ordered.     Orders:    CBC and differential; Future    Comprehensive metabolic panel; Future    Lipid panel; Future

## 2024-12-04 NOTE — ASSESSMENT & PLAN NOTE
Has longstanding history of GERD and is due for colonoscopy, will refer to GI.  Orders:    Ambulatory Referral to Gastroenterology; Future

## 2024-12-13 ENCOUNTER — APPOINTMENT (OUTPATIENT)
Dept: RADIOLOGY | Facility: CLINIC | Age: 79
End: 2024-12-13
Payer: MEDICARE

## 2024-12-13 ENCOUNTER — OFFICE VISIT (OUTPATIENT)
Dept: OBGYN CLINIC | Facility: CLINIC | Age: 79
End: 2024-12-13
Payer: MEDICARE

## 2024-12-13 VITALS — HEIGHT: 65 IN | WEIGHT: 150.8 LBS | BODY MASS INDEX: 25.12 KG/M2

## 2024-12-13 DIAGNOSIS — M70.61 TROCHANTERIC BURSITIS OF RIGHT HIP: Primary | ICD-10-CM

## 2024-12-13 DIAGNOSIS — M25.561 CHRONIC PAIN OF RIGHT KNEE: ICD-10-CM

## 2024-12-13 DIAGNOSIS — M70.61 TROCHANTERIC BURSITIS OF RIGHT HIP: ICD-10-CM

## 2024-12-13 DIAGNOSIS — G89.29 CHRONIC PAIN OF RIGHT KNEE: ICD-10-CM

## 2024-12-13 DIAGNOSIS — M76.31 ILIOTIBIAL BAND SYNDROME AFFECTING LOWER LEG, RIGHT: ICD-10-CM

## 2024-12-13 DIAGNOSIS — Z96.651 HISTORY OF TOTAL KNEE ARTHROPLASTY, RIGHT: ICD-10-CM

## 2024-12-13 PROCEDURE — 99204 OFFICE O/P NEW MOD 45 MIN: CPT | Performed by: ORTHOPAEDIC SURGERY

## 2024-12-13 PROCEDURE — 73560 X-RAY EXAM OF KNEE 1 OR 2: CPT

## 2024-12-13 PROCEDURE — 73502 X-RAY EXAM HIP UNI 2-3 VIEWS: CPT

## 2024-12-13 PROCEDURE — 73562 X-RAY EXAM OF KNEE 3: CPT

## 2024-12-13 NOTE — PROGRESS NOTES
Assessment/Plan:  1. Trochanteric bursitis of right hip  Ambulatory referral to Orthopedic Surgery    XR hip/pelv 2-3 vws right if performed    Ambulatory Referral to Physical Therapy      2. Iliotibial band syndrome affecting lower leg, right  Ambulatory Referral to Physical Therapy      3. Chronic pain of right knee  Ambulatory referral to Orthopedic Surgery    XR hip/pelv 2-3 vws right if performed    XR knee 3 vw right non injury    XR knee 1 or 2 vw left    Ambulatory Referral to Physical Therapy      4. History of total knee arthroplasty, right  Ambulatory Referral to Physical Therapy        Scribe Attestation      I,:  Kaden Perales am acting as a scribe while in the presence of the attending physician.:       I,:  Servando Raya, DO personally performed the services described in this documentation    as scribed in my presence.:           Shanita is a pleasant 79-year-old female who presents today for initial evaluation of her right leg pain.  After reviewing her imaging and performing a thorough history and physical exam, I explained to her that her right knee prosthesis is stable on imaging and on clinical exam and is functioning well.  She does appear symptomatic of greater trochanteric bursitis and iliotibial band syndrome.  I recommended that she initiate physical therapy and provided her with a referral for this today.  I also encouraged her to begin using Voltaren gel.  I would like to see her back in approximately 3 months for repeat clinical evaluation.  All of her questions and concerns were addressed today.    Subjective: Initial evaluation for right leg pain    Patient ID: Shanita Villegas is a 79 y.o. female who presents today for initial evaluation of her right leg pain.  At today's visit, she reports a history of right total knee arthroplasty performed in January 2021.  She also reports a history of treatment for greater trochanteric bursitis, including corticosteroid injection most  recently in August.  She complains of persistent pain about the lateral aspect of her right hip as well as the posterior aspect of her right knee and lateral right knee.  She underwent a bone scan in [place] which was unremarkable.  She complains of trouble with her balance due to her pain.  She denies any recent injury or trauma.  She denies any pain into her groin.    Review of Systems   Constitutional:  Positive for activity change. Negative for chills, fever and unexpected weight change.   HENT:  Negative for hearing loss, nosebleeds and sore throat.    Eyes:  Negative for pain, redness and visual disturbance.   Respiratory:  Negative for cough, shortness of breath and wheezing.    Cardiovascular:  Negative for chest pain, palpitations and leg swelling.   Gastrointestinal:  Negative for abdominal pain, nausea and vomiting.   Endocrine: Negative for polydipsia and polyuria.   Genitourinary:  Negative for dysuria and hematuria.   Musculoskeletal:  Positive for arthralgias and myalgias. Negative for joint swelling.        See HPI   Skin:  Negative for rash and wound.   Neurological:  Negative for dizziness, numbness and headaches.   Psychiatric/Behavioral:  Negative for decreased concentration and suicidal ideas. The patient is not nervous/anxious.          Past Medical History:   Diagnosis Date    Allergic rhinitis     GERD (gastroesophageal reflux disease)     Hypertension     Urinary tract infection        Past Surgical History:   Procedure Laterality Date    BLADDER REPAIR      BREAST LUMPECTOMY Right     benign    JOINT REPLACEMENT Right     TKR       Family History   Problem Relation Age of Onset    Other (chronic lymp) Mother     Coronary artery disease Father     Kidney failure Father     Hypothyroidism Sister     Liver cancer Sister     Diabetes Brother     Hypothyroidism Brother        Social History     Occupational History    Not on file   Tobacco Use    Smoking status: Never    Smokeless tobacco:  Never   Vaping Use    Vaping status: Never Used   Substance and Sexual Activity    Alcohol use: Yes     Comment: social    Drug use: Never    Sexual activity: Not on file         Current Outpatient Medications:     aspirin (Aspirin 81) 81 mg EC tablet, Take 81 mg by mouth daily, Disp: , Rfl:     cetirizine (ZyrTEC) 10 mg tablet, Take 10 mg by mouth daily, Disp: , Rfl:     Cyanocobalamin (Vitamin B-12) 5000 MCG TBDP, Take by mouth, Disp: , Rfl:     DILTIAZEM HCL ER PO, Take 240 mg by mouth, Disp: , Rfl:     omeprazole (PriLOSEC) 20 mg delayed release capsule, Take 20 mg by mouth daily, Disp: , Rfl:     rosuvastatin (CRESTOR) 5 mg tablet, Take 1 tablet (5 mg total) by mouth daily, Disp: 90 tablet, Rfl: 3    No Known Allergies    Objective:  There were no vitals filed for this visit.    Body mass index is 25.09 kg/m².    Right Knee Exam     Tenderness   Right knee tenderness location: distal ITB.    Range of Motion   Extension:  0   Flexion:  120     Tests   Varus: negative Valgus: negative  Drawer:  Anterior - negative    Posterior - negative  Patellar apprehension: negative    Other   Erythema: absent  Scars: present  Sensation: normal  Pulse: present  Swelling: none  Effusion: no effusion present    Comments:  Well healed anterior surgical scar  Stable at 0, 30 and 90 degrees  Neurovascularly in tact distally  No warmth or erythema  Tender distal IT band to gurdy's tubercle      Right Hip Exam     Tenderness   The patient is experiencing tenderness in the greater trochanter and lateral.    Range of Motion   Abduction:  45   Adduction:  30   Flexion:  120   External rotation:  50   Internal rotation:  20     Muscle Strength   Flexion: 5/5     Tests   DICK: negative    Other   Erythema: absent  Scars: absent  Sensation: normal  Pulse: present    Comments:  Stinchfield: negative  FADIR: negative          Observations     Right Knee   Negative for effusion.       Physical Exam  Vitals and nursing note reviewed.    Constitutional:       Appearance: Normal appearance. She is well-developed.   HENT:      Head: Normocephalic and atraumatic.      Right Ear: External ear normal.      Left Ear: External ear normal.   Eyes:      General: No scleral icterus.     Extraocular Movements: Extraocular movements intact.      Conjunctiva/sclera: Conjunctivae normal.   Cardiovascular:      Rate and Rhythm: Normal rate.   Pulmonary:      Effort: Pulmonary effort is normal. No respiratory distress.   Musculoskeletal:      Cervical back: Normal range of motion and neck supple.      Right knee: No effusion.      Comments: See Ortho exam   Skin:     General: Skin is warm and dry.   Neurological:      General: No focal deficit present.      Mental Status: She is alert and oriented to person, place, and time.   Psychiatric:         Behavior: Behavior normal.         I have personally reviewed pertinent films in PACS.    X-ray of the right hip obtained on 12/13/2024 reviewed demonstrating moderate to severe degenerative change with medial narrowing.  There is sclerosis and osteophytosis.  There is no acute fracture, dislocation, lytic or blastic lesion.  X-ray of the right knee obtained on 12/13/2024 reviewed demonstrating a well-positioned and aligned cemented total knee arthroplasty without evidence of failure.  There is no new fracture, dislocation, lytic or blastic lesion.    This document was created using speech voice recognition software.   Grammatical errors, random word insertions, pronoun errors, and incomplete sentences are an occasional consequence of this system due to software limitations, ambient noise, and hardware issues.   Any formal questions or concerns about content, text, or information contained within the body of this dictation should be directly addressed to the provider for clarification.

## 2024-12-19 ENCOUNTER — EVALUATION (OUTPATIENT)
Dept: PHYSICAL THERAPY | Facility: CLINIC | Age: 79
End: 2024-12-19
Payer: MEDICARE

## 2024-12-19 DIAGNOSIS — G89.29 CHRONIC PAIN OF RIGHT KNEE: ICD-10-CM

## 2024-12-19 DIAGNOSIS — Z96.651 HISTORY OF TOTAL KNEE ARTHROPLASTY, RIGHT: ICD-10-CM

## 2024-12-19 DIAGNOSIS — M76.31 ILIOTIBIAL BAND SYNDROME AFFECTING LOWER LEG, RIGHT: ICD-10-CM

## 2024-12-19 DIAGNOSIS — M25.561 CHRONIC PAIN OF RIGHT KNEE: ICD-10-CM

## 2024-12-19 DIAGNOSIS — M70.61 TROCHANTERIC BURSITIS OF RIGHT HIP: Primary | ICD-10-CM

## 2024-12-19 PROCEDURE — 97161 PT EVAL LOW COMPLEX 20 MIN: CPT | Performed by: PHYSICAL THERAPIST

## 2024-12-19 NOTE — PROGRESS NOTES
PT Evaluation   Today's date: 2024  Patient name: Shanita Villegas  : 1945  MRN: [id number]  Referring provider: Servando Raya DO  Dx:   Encounter Diagnosis     ICD-10-CM    1. Trochanteric bursitis of right hip  M70.61 Ambulatory Referral to Physical Therapy      2. Iliotibial band syndrome affecting lower leg, right  M76.31 Ambulatory Referral to Physical Therapy      3. Chronic pain of right knee  M25.561 Ambulatory Referral to Physical Therapy    G89.29       4. History of total knee arthroplasty, right  Z96.651 Ambulatory Referral to Physical Therapy            CASE SUMMARY:   Shanita Villegas is a 79 y.o. year old female who presents to OPPT upon referral from ortho  secondary to c/o right hip pain, knee pain and dx with IT band syndrome, right hip bursitis,  history of right TKR.  Shanita reports onset of symptoms ~  1 year ago as a result of possibly ~ 1[place] , she got [place], and right leg was caught under her in a twisted position. Had difficulty getting up from that position.  Denied pain immediately after.  Current symptom location includes lateral right hip area of trochanter, general knee,   and patient describes  current symptoms as: tightness in knee, sharp.  Symptoms are : intermittent  Pain level:  Current: 0/10 and with ADL's 5-6/10.  Symptoms improve with: voltaren, change position in bed, , and worsen with stairs descending> climbing, denies paresthesias, + low back pain occurs several times per week, inc with sitting.  Overall symptom progression at this time is unchanging.   Previous injury to this area: right tkr,   Previous treatment includes: PT did not continue with HEP due to moving homes  Diagnostic testing includes: xrays vanna knees, right hip.     PMHx includes: See chart for full details with medications, allergies, past family history, past medical history, social history, past surgical history and problem list. All topics were  reviewed.      Functionally, at baseline, Shanita is independent with all basic ADL's and  advanced ADL's.  Currently, Shanita is limited in the following activities: stairs, diffiuclty getting on/off floor, wakes 2-3 x night.      Shanita is lives alone in a 2 story home with 0 stairs to enter and flight stairs inside with + railings.   Recreational activities include: playing cards (1-3 x week) .  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     Patient goal(s) for physical therapy is: less to no pain and feeling more comfortable, to improved her fatigue with activity.    Concurrent Complaints:  Red flags present: occasional urinary leakage  (has had previous surgery for this)  Has lost 15 pounds in past 6 months, possible due to stress of moving.     Upper Motor Neuron testing (present/not present):  Allen (middle finger first segment flick + sign is first and second finger to flexion): neg  Inverted supinator ( reflex hammer to supinator normal: neg is wrist extension, + wrist and finger flexion) : 2+    Reflexes: NT    Posture   Standing: slight right knee flexion , forward head , mild genu valgum on right     LLD:iliac crest =    Skin creases:neg    Shift:neg    Scoliosis: neg        Gait: reduced stance time on right, inc pronation   Assistive device: neg   Trunk movement: wnl   Stride length: wnl   Trendelenburg: neg   Foot drop: neg    Functional movements:   Squat: + genu valgum on right , but able to tap and stand without UE assistance (+)pain knee   SLS: NT   Bridging: able to lift 75% (-) pain   Transfers sit/stand: independent   bed mobility independent    Lumbar AROM:    Flexion: wnl LOM (-) pain   Extension: moderate LOM (-) pain   Side bend: Right: wnl LOM (-) pain     Left: wnl LOM (-) pain    Rotation: right: Desc; minimal/small/moderate/large/very large:[id number]     Repeated motions: BL no pain   Standing flexion:    Effect: NE  Standing extension:   Effect: NE      Palpation: + TTP over right  greater trochanter, right latera knee joint    LE MMT  L Hip Flexion: 4-/5  R Hip Flexion: 4+/5   L Hip Extension: 4/5 R Hip Extension: 4/5   L Hip Abduction: 4+/5 R Hip Abduction: 3+/5   L Hip Adduction: 4+/5 R Hip Adduction: 4+/5   L hip ER: 4-/5 R hip ER: 3+/5 (clamshells)   L hip IR 4/5 R hip IR: 4/5   L Knee Extension: 4+/5 R Knee Extension: 4/5   L Knee Flexion: 4+/5 R Knee Flexion: 4/5   L Ankle DF: 4/5 R Ankle DF: 4/5   L Ankle PF: 4/5  R Ankle PF: 4/5       LE ROM  L hip flexion: wnl degrees R hip flexion: wnl degrees   L hip extension: wnl degrees R hip extension: wnl degrees   L hip IR: wnl degrees  R hip IR: 20 degrees    L hip ER: wnl degrees  R hip ER: wnl degrees    L hip abduction: wnl degrees  R hip abduction: 25 degrees    L knee flex: 149 R knee flexion: 120    L knee ext: wnl R knee ext: -2        Dermatomes: wnl to light touch      Flexibility:  Hip flexors:Right: fair  Left: fair           FOTO score is 51% with a 63% prediction in function.       Assessment  Assessment details: Patient is a 79 y.o. Female who presents to skilled outpatient PT for the diagnosis of   Encounter Diagnosis     ICD-10-CM    1. Trochanteric bursitis of right hip  M70.61 Ambulatory Referral to Physical Therapy      2. Iliotibial band syndrome affecting lower leg, right  M76.31 Ambulatory Referral to Physical Therapy      3. Chronic pain of right knee  M25.561 Ambulatory Referral to Physical Therapy    G89.29       4. History of total knee arthroplasty, right  Z96.651 Ambulatory Referral to Physical Therapy      . Patient presents with reduced posture including low arches, genu valgum right, reduced strength right hip motions per objective data, squat dysfunction  and will benefit from skilled PT to address the objective findings. Also noted was reduced knee pain on stairs when facilitated glutes during glute drive.  The aforementioned impairments have limited the patient's ability to function.   Patient vocalized a  good understanding of  PLOC and HEP issuedMargaret Diehl would benefit from skilled physical therapy services to address the functional limitations and progress towards prior level of function, to meet stated goals,  and independence with home exercise program.  Prognosis for successful rehab outcome is good with consistent participation in therapy and carryover of HEP and PLOC.No further referral is necessary at this time based upon examination results. Patient education performed during today's session included: Hep and PLOC.     Understanding of Dx/Px/POC: Good  Prognosis: Good    STG  + Patient will report a 35% improvement in symptoms (2-3 weeks)   + Patient will be independent in basic HEP (2-3 weeks)  + Patient will report increased ease climbing stairs due to a reduction in pain (2-3 weeks)      LTG:  + Patient will report a 65% improvement in symptoms (4-6 weeks)   + Patient will increase FOTO score by 10 points (8 sessions)   + Patient will be independent in comprehensive HEP (4-6 weeks)  + Patient will demonstrate an increase in range of motion  right hip   to  within normal limits  (4-6 weeks)  + Patient will demonstrate increase  MMT of  hip/knee to  4+/5 for maximum function. (4-6 weeks)  + Patient will report no functional limitations (4-6 weeks)    Plan  Plan details: HEP development, stretching as needed per objective findings, strengthening core/lower quadrant, A/AA/PROM lumbar/hip motions, joint mobilizations prn, posture education, STM/MI as needed to reduce muscle tension per objective findings, muscle reeducation per objective findings, dynamic stabilization, PLOC discussed and agreed upon with patient , focus on glute, hip strengthening, ktape for navicular sling    Patient would benefit from: Skilled PT  Planned therapy interventions: Abdominal trunk stabilization, Balance, Gait training, HEP, Joint mobilization, Manual therapy, Neuromuscular re-education, Patient education, Postural training,  Strengthening, Stretching, Therapeutic exercises, and Activity modification  Frequency:  1-2 x week  Duration in weeks: 6 weeks  Plan of Care beginning date: 12/19/24  Plan of Care expiration date: 6 weeks - 1/30/2025    Treatment plan discussed with: Patient  Patient verbalized understanding of POC. Please contact me if you have any questions or recommendations. Thank you for the referral and the opportunity to share in Shanita Villegas's care.          Eval/ Re-eval Auth #/ Referral # Total visits Start date  Expiration date Total active units  Total manual units  PT only or  PT+OT?   12/19/2024 No auth, no visit limit                         Past Medical History:   Diagnosis Date    Allergic rhinitis     GERD (gastroesophageal reflux disease)     Hypertension     Urinary tract infection                      Reeval:   Insurance :         Visits: 1 2 3 4 5   Manual: 12/19/2024       STM/MI:prn        Joint mobs: hip w/belt                                        Ther ex/NMR:        Manual stretching: hamstring, hip flexor, piriformis/glute        Rec bike/  nustep        Clamshells        Sidelying hip abduction         SLR                Frankie test stretch        hamstring stretch                sidestepping        Monster walks        leg press: vanna                                Sit/stand with hip hinge + band        NMR:        TA activation (stab cuff)        + marching        +hip abd iso        +hip add iso        + alternating LE extension                Glut set prone        Glute set + bridge                Therapeutic Activity:         Reevaluation                Gait Training:                 HEP:                 Modalities        MH        ice        Total time:

## 2024-12-23 ENCOUNTER — OFFICE VISIT (OUTPATIENT)
Dept: PHYSICAL THERAPY | Facility: CLINIC | Age: 79
End: 2024-12-23
Payer: MEDICARE

## 2024-12-23 DIAGNOSIS — M70.61 TROCHANTERIC BURSITIS OF RIGHT HIP: Primary | ICD-10-CM

## 2024-12-23 DIAGNOSIS — Z96.651 HISTORY OF TOTAL KNEE ARTHROPLASTY, RIGHT: ICD-10-CM

## 2024-12-23 DIAGNOSIS — M25.561 CHRONIC PAIN OF RIGHT KNEE: ICD-10-CM

## 2024-12-23 DIAGNOSIS — G89.29 CHRONIC PAIN OF RIGHT KNEE: ICD-10-CM

## 2024-12-23 DIAGNOSIS — M76.31 ILIOTIBIAL BAND SYNDROME AFFECTING LOWER LEG, RIGHT: ICD-10-CM

## 2024-12-23 PROCEDURE — 97112 NEUROMUSCULAR REEDUCATION: CPT | Performed by: PHYSICAL THERAPIST

## 2024-12-23 PROCEDURE — 97110 THERAPEUTIC EXERCISES: CPT | Performed by: PHYSICAL THERAPIST

## 2024-12-23 NOTE — PROGRESS NOTES
Daily Note         Today's date: 2024  Patient name: Shanita Villegas  : 1945  MRN: [id number]  Referring provider: Servando Raya DO  Dx:   Encounter Diagnosis     ICD-10-CM    1. Trochanteric bursitis of right hip  M70.61       2. Iliotibial band syndrome affecting lower leg, right  M76.31       3. Chronic pain of right knee  M25.561     G89.29       4. History of total knee arthroplasty, right  Z96.651           Subjective: Shanita Villegas reports minor pain today in the hip due to running errands.  Pain level entering today is 0/10.        Objective: See treatment diary below    Assessment:   upgraded HEP this session including written instructions. Patient with good tolerance to all exercises and fatigued quickly with   . Patient was issued updated Hep in written form and appropriate band if needed. Patient demonstrated independence with these exercises.   Fatigued quickly with sidleying hip abduction. Instructed in glute drive with step ups to facilitate glutes as well as quads      Plan:  progress stretching and strengthening as tolerated.               Eval/ Re-eval Auth #/ Referral # Total visits Start date  Expiration date Total active units  Total manual units  PT only or  PT+OT?   2024 No auth, no visit limit                         Past Medical History:   Diagnosis Date    Allergic rhinitis     GERD (gastroesophageal reflux disease)     Hypertension     Urinary tract infection                      Reeval:   Insurance :         Visits: 1 2 3 4 5   Manual: 2024      STM/MI:prn        Joint mobs: hip w/belt                                        Ther ex/NMR:        Manual stretching: hamstring, hip flexor, piriformis/glute        Rec bike/  nustep  --      Clamshells  10 x 2              Sidelying hip abduction   10 x       SLR  10 x 2       LAQ  2 lb 5 sec x 10       Frankie test stretch  --      hamstring stretch  10 sec x 5                sidestepping         Monster walks        leg press: vanna  65 lb 10 x  75 lb 10 x       Step ups: fwd  Lv 2 10 x 2       Eccentric lowering off step laterally  Lv 2 10 x 2              Sit/stand with hip hinge + band        NMR:        TA activation (stab cuff)        + marching        +hip abd iso        +hip add iso        + alternating LE extension                Glut set prone  5 sec x 20       Glute set + bridge  10 x 2              Therapeutic Activity:         Reevaluation                Gait Training:                 HEP:                 Modalities        MH        ice        Total time:          Access Code: [email or web address]  URL: [email or web address]  Date: 12/23/2024  Prepared by: Vera Arriaga    Exercises  - Seated Long Arc Quad with Ankle Weight  - 1 x daily - 7 x weekly - 2 sets - 10 reps  - Supine Active Straight Leg Raise  - 1 x daily - 7 x weekly - 2 sets - 10 reps  - Clamshell  - 1 x daily - 7 x weekly - 2 sets - 10 reps  - Sidelying Hip Abduction  - 1 x daily - 7 x weekly - 1 sets - 10 reps  - Supine Hamstring Stretch with Strap  - 1 x daily - 7 x weekly - 1 sets - 5 reps - 10 sec  hold

## 2025-01-06 ENCOUNTER — OFFICE VISIT (OUTPATIENT)
Dept: PHYSICAL THERAPY | Facility: CLINIC | Age: 80
End: 2025-01-06
Payer: MEDICARE

## 2025-01-06 DIAGNOSIS — M70.61 TROCHANTERIC BURSITIS OF RIGHT HIP: Primary | ICD-10-CM

## 2025-01-06 DIAGNOSIS — M25.561 CHRONIC PAIN OF RIGHT KNEE: ICD-10-CM

## 2025-01-06 DIAGNOSIS — M76.31 ILIOTIBIAL BAND SYNDROME AFFECTING LOWER LEG, RIGHT: ICD-10-CM

## 2025-01-06 DIAGNOSIS — Z96.651 HISTORY OF TOTAL KNEE ARTHROPLASTY, RIGHT: ICD-10-CM

## 2025-01-06 DIAGNOSIS — G89.29 CHRONIC PAIN OF RIGHT KNEE: ICD-10-CM

## 2025-01-06 PROCEDURE — 97110 THERAPEUTIC EXERCISES: CPT | Performed by: PHYSICAL THERAPIST

## 2025-01-06 NOTE — PROGRESS NOTES
Daily Note         Today's date: 2025  Patient name: Shanita Villegas  : 1945  MRN: [id number]  Referring provider: Servando Raya DO  Dx:   Encounter Diagnosis     ICD-10-CM    1. Trochanteric bursitis of right hip  M70.61       2. Chronic pain of right knee  M25.561     G89.29       3. Iliotibial band syndrome affecting lower leg, right  M76.31       4. History of total knee arthroplasty, right  Z96.651           Subjective: Shanita Villegas reports pain entering today is right hip pain 3/10. Otherwise no knee pain. States she did HEP but not as much as she would have liked.        Objective: See treatment diary below    Assessment:   patient program changed only minimally due to reduced frequency of HEP over holiday. Patient had good flexibility with manual stretching in all directions.  Discussed need to resume HEP and patient vocalized understanding.    Patient reported fatigue by end of session in right LE but denies pain.       Plan: Progress treatment as tolerated.            Eval/ Re-eval Auth #/ Referral # Total visits Start date  Expiration date Total active units  Total manual units  PT only or  PT+OT?   2024 No auth, no visit limit         25 Reports no change in insurance over new year.               Past Medical History:   Diagnosis Date    Allergic rhinitis     GERD (gastroesophageal reflux disease)     Hypertension     Urinary tract infection                      Reeval: 25  Insurance :         Visits: 1 2 3 4 5   Manual: 2024     STM/MI:prn        Joint mobs: hip w/belt                                        Ther ex/NMR:        Manual stretching: hamstring, hip flexor, piriformis/glute   Performed each      Rec bike/  nustep  --      Clamshells  10 x 2 10 x 2             Sidelying hip abduction   10 x  10 x sidelying  Hip abd + heel against the wall: 10 x 2      SLR  10 x 2  10 x 2     LAQ  2 lb 5 sec x 10  5 sec  x 10 x 2 rounds               Frankie test stretch  --      hamstring stretch  10 sec x 5   W/ SOS: 10 sec x 5              sidestepping        Monster walks        leg press: vanna  65 lb 10 x  75 lb 10 x  65 lb 10 x  75 lb 10 x   85 lb 10 x     Step ups: fwd  Lv 2 10 x 2  Lv 2 10 x 2      Eccentric lowering off step laterally  Lv 2 10 x 2 Lv 2              Sit/stand with hip hinge + band        NMR:        TA activation (stab cuff)        + marching        +hip abd iso        +hip add iso        + alternating LE extension                Glut set prone  5 sec x 20  5 sec x 20      Glute set + bridge  10 x 2 10 x 2             Therapeutic Activity:         Reevaluation                Gait Training:                 HEP:                 Modalities        MH        ice        Total time:          Access Code: [email or web address]  URL: [email or web address]  Date: 12/23/2024  Prepared by: Vera Arriaga    Exercises  - Seated Long Arc Quad with Ankle Weight  - 1 x daily - 7 x weekly - 2 sets - 10 reps  - Supine Active Straight Leg Raise  - 1 x daily - 7 x weekly - 2 sets - 10 reps  - Clamshell  - 1 x daily - 7 x weekly - 2 sets - 10 reps  - Sidelying Hip Abduction  - 1 x daily - 7 x weekly - 1 sets - 10 reps  - Supine Hamstring Stretch with Strap  - 1 x daily - 7 x weekly - 1 sets - 5 reps - 10 sec  hold

## 2025-01-09 ENCOUNTER — OFFICE VISIT (OUTPATIENT)
Dept: PHYSICAL THERAPY | Facility: CLINIC | Age: 80
End: 2025-01-09
Payer: MEDICARE

## 2025-01-09 DIAGNOSIS — M70.61 TROCHANTERIC BURSITIS OF RIGHT HIP: Primary | ICD-10-CM

## 2025-01-09 DIAGNOSIS — M25.561 CHRONIC PAIN OF RIGHT KNEE: ICD-10-CM

## 2025-01-09 DIAGNOSIS — G89.29 CHRONIC PAIN OF RIGHT KNEE: ICD-10-CM

## 2025-01-09 DIAGNOSIS — M76.31 ILIOTIBIAL BAND SYNDROME AFFECTING LOWER LEG, RIGHT: ICD-10-CM

## 2025-01-09 DIAGNOSIS — Z96.651 HISTORY OF TOTAL KNEE ARTHROPLASTY, RIGHT: ICD-10-CM

## 2025-01-09 PROCEDURE — 97112 NEUROMUSCULAR REEDUCATION: CPT | Performed by: PHYSICAL THERAPIST

## 2025-01-09 PROCEDURE — 97110 THERAPEUTIC EXERCISES: CPT | Performed by: PHYSICAL THERAPIST

## 2025-01-09 NOTE — PROGRESS NOTES
Daily Note         Today's date: 2025  Patient name: Shanita Villegas  : 1945  MRN: [id number]  Referring provider: Servando Raya DO  Dx:   Encounter Diagnosis     ICD-10-CM    1. Trochanteric bursitis of right hip  M70.61       2. Chronic pain of right knee  M25.561     G89.29       3. Iliotibial band syndrome affecting lower leg, right  M76.31       4. History of total knee arthroplasty, right  Z96.651           Subjective: Shanita Villegas reports no significant soreness after last session. States she is able to walk better after PT.       Objective: See treatment diary below    Assessment:   program progressed as per treatment diary. Patient had good tolerance and denied pain during session.   Patient still needs manual and verbal cuing for proper form with leg press and step up to avoid genu valgum right greater then left.  Good carryover noted post cuing.     Plan:  progress strength and flexibility          Eval/ Re-eval Auth #/ Referral # Total visits Start date  Expiration date Total active units  Total manual units  PT only or  PT+OT?   2024 No auth, no visit limit         25 Reports no change in insurance over new year.               Past Medical History:   Diagnosis Date    Allergic rhinitis     GERD (gastroesophageal reflux disease)     Hypertension     Urinary tract infection                      Reeval: 25  Insurance :     Foto performed: 65 d/c'd    Visits: 1 2 3 4 5   Manual: 2024    STM/MI:prn        Joint mobs: hip w/belt                        Ther ex/NMR:        Manual stretching: hamstring, hip flexor, piriformis/glute   Performed each  performed    Rec bike/  nustep  --  Rec bike 5 min lv 1     Clamshells  10 x 2 10 x 2     Sidelying hip abduction   10 x  10 x sidelying  Hip abd + heel against the wall: 10 x 2  heel against the wall: 10 x 2     SLR  10 x 2  10 x 2     LAQ  2 lb 5 sec x 10  5 sec  x 10 x 2 rounds  2 lb 5 sec  x  20    Frankie test stretch  --      hamstring stretch  10 sec x 5   W/ SOS: 10 sec x 5  manually            sidestepping     ++   Monster walks        leg press: vanna  65 lb 10 x  75 lb 10 x  65 lb 10 x  75 lb 10 x   85 lb 10 x 85 lb 10 x 1  95 lb 10 x 1     Step ups: fwd  Lv 2 10 x 2  Lv 2 10 x 2  Lv 3 10 x 2 vanna   Reverse step downs: lv 3 10 x 2    Eccentric lowering off step laterally  Lv 2 10 x 2 Lv 2  Lv 3 10 x  vanna             Sit/stand with hip hinge + band    Lower to chair: green band 10 x 2    NMR:        TA activation (stab cuff)        + marching        +hip abd iso        +hip add iso        + alternating LE extension                Glut set prone  5 sec x 20  5 sec x 20      Glute set + bridge  10 x 2 10 x 2             Therapeutic Activity:         Reevaluation                Gait Training:                 HEP:                 Modalities        MH        ice        Total time:          Access Code: [email or web address]  URL: [email or web address]  Date: 12/23/2024  Prepared by: Vera Arriaga    Exercises  - Seated Long Arc Quad with Ankle Weight  - 1 x daily - 7 x weekly - 2 sets - 10 reps  - Supine Active Straight Leg Raise  - 1 x daily - 7 x weekly - 2 sets - 10 reps  - Clamshell  - 1 x daily - 7 x weekly - 2 sets - 10 reps  - Sidelying Hip Abduction  - 1 x daily - 7 x weekly - 1 sets - 10 reps  - Supine Hamstring Stretch with Strap  - 1 x daily - 7 x weekly - 1 sets - 5 reps - 10 sec  hold

## 2025-01-13 ENCOUNTER — OFFICE VISIT (OUTPATIENT)
Dept: PHYSICAL THERAPY | Facility: CLINIC | Age: 80
End: 2025-01-13
Payer: MEDICARE

## 2025-01-13 DIAGNOSIS — Z96.651 HISTORY OF TOTAL KNEE ARTHROPLASTY, RIGHT: ICD-10-CM

## 2025-01-13 DIAGNOSIS — M70.61 TROCHANTERIC BURSITIS OF RIGHT HIP: Primary | ICD-10-CM

## 2025-01-13 DIAGNOSIS — M76.31 ILIOTIBIAL BAND SYNDROME AFFECTING LOWER LEG, RIGHT: ICD-10-CM

## 2025-01-13 DIAGNOSIS — G89.29 CHRONIC PAIN OF RIGHT KNEE: ICD-10-CM

## 2025-01-13 DIAGNOSIS — M25.561 CHRONIC PAIN OF RIGHT KNEE: ICD-10-CM

## 2025-01-13 PROCEDURE — 97110 THERAPEUTIC EXERCISES: CPT | Performed by: PHYSICAL THERAPIST

## 2025-01-13 PROCEDURE — 97112 NEUROMUSCULAR REEDUCATION: CPT | Performed by: PHYSICAL THERAPIST

## 2025-01-13 NOTE — PROGRESS NOTES
Daily Note         Today's date: 2025  Patient name: Shanita Villegas  : 1945  MRN: [id number]  Referring provider: Servando Raya DO  Dx:   Encounter Diagnosis     ICD-10-CM    1. Trochanteric bursitis of right hip  M70.61       2. Chronic pain of right knee  M25.561     G89.29       3. Iliotibial band syndrome affecting lower leg, right  M76.31       4. History of total knee arthroplasty, right  Z96.651           Subjective: Shanita Villegas reports occasional knee pain but overlal much improved.        Objective: See treatment diary below    Assessment:   program advanced as per treatment diary. Patient had good tolerance to progressions with goal to inc strength right LE and flexibility . The goal of the current treatment is to address patient's functional limitations as well as objective findings.   Utilized Green band around knees to facilitate glutes during leg press.   Patient was highly challenged with SLS needed vanna UE support.     Plan:  progress strengthening.           Eval/ Re-eval Auth #/ Referral # Total visits Start date  Expiration date Total active units  Total manual units  PT only or  PT+OT?   2024 No auth, no visit limit         25 Reports no change in insurance over new year.               Past Medical History:   Diagnosis Date    Allergic rhinitis     GERD (gastroesophageal reflux disease)     Hypertension     Urinary tract infection                      Reeval: 25  Insurance :     Foto performed: 65 d/c'd    Visits: 1 2 3 4 5   Manual: 2024   STM/MI:prn        Joint mobs: hip w/belt                        Ther ex/NMR:        Manual stretching: hamstring, hip flexor, piriformis/glute   Performed each  performed Hamstring stretch   Rec bike/  nustep  --  Rec bike 5 min lv 1  Rec bike: 7 min lv 2   Clamshells  10 x 2 10 x 2  5 sec x 15    Sidelying hip abduction   10 x  10 x sidelying  Hip abd + heel against the wall:  10 x 2  heel against the wall: 10 x 2  Sidelying: 10 x 2   SLR  10 x 2  10 x 2  5 sec x 10 x 2   LAQ  2 lb 5 sec x 10  5 sec  x 10 x 2 rounds  2 lb 5 sec  x 20 2 lb 5 sec x 20    Frankie test stretch  --      hamstring stretch  10 sec x 5   W/ SOS: 10 sec x 5  manually Manually 10 sec x 5             sidestepping     Green band: 3 rounds 16 feet    Monster walks        leg press: vanna  65 lb 10 x  75 lb 10 x  65 lb 10 x  75 lb 10 x   85 lb 10 x 85 lb 10 x 1  95 lb 10 x 1  Green band around knees to facilitate glutes  85 lb 10 x  95 lb 10 x  2   Step ups: fwd  Lv 2 10 x 2  Lv 2 10 x 2  Lv 3 10 x 2 avnna   Reverse step downs: lv 3 10 x 2 Lv 3 10 x 2 vanna   Reverse step downs: lv 3 10 x 2   Eccentric lowering off step laterally  Lv 2 10 x 2 Lv 2  Lv 3 10 x  vanna             Sit/stand with hip hinge + band    Lower to chair: green band 10 x 2 10 x 2   NMR:        TA activation (stab cuff)        + marching     20 x    +hip abd iso        +hip add iso        + alternating LE extension        SLS     5 sec x10 vanna 2 finger support   Glut set prone  5 sec x 20  5 sec x 20      Glute set + bridge  10 x 2 10 x 2  5 sec x 20           Therapeutic Activity:         Reevaluation                Gait Training:                 HEP:                 Modalities        MH        ice        Total time:          Access Code: [email or web address]  URL: [email or web address]  Date: 12/23/2024  Prepared by: Vera Arriaga    Exercises  - Seated Long Arc Quad with Ankle Weight  - 1 x daily - 7 x weekly - 2 sets - 10 reps  - Supine Active Straight Leg Raise  - 1 x daily - 7 x weekly - 2 sets - 10 reps  - Clamshell  - 1 x daily - 7 x weekly - 2 sets - 10 reps  - Sidelying Hip Abduction  - 1 x daily - 7 x weekly - 1 sets - 10 reps  - Supine Hamstring Stretch with Strap  - 1 x daily - 7 x weekly - 1 sets - 5 reps - 10 sec  hold

## 2025-01-16 ENCOUNTER — OFFICE VISIT (OUTPATIENT)
Dept: PHYSICAL THERAPY | Facility: CLINIC | Age: 80
End: 2025-01-16
Payer: MEDICARE

## 2025-01-16 DIAGNOSIS — M25.561 CHRONIC PAIN OF RIGHT KNEE: ICD-10-CM

## 2025-01-16 DIAGNOSIS — M76.31 ILIOTIBIAL BAND SYNDROME AFFECTING LOWER LEG, RIGHT: ICD-10-CM

## 2025-01-16 DIAGNOSIS — G89.29 CHRONIC PAIN OF RIGHT KNEE: ICD-10-CM

## 2025-01-16 DIAGNOSIS — Z96.651 HISTORY OF TOTAL KNEE ARTHROPLASTY, RIGHT: ICD-10-CM

## 2025-01-16 DIAGNOSIS — M70.61 TROCHANTERIC BURSITIS OF RIGHT HIP: Primary | ICD-10-CM

## 2025-01-16 PROCEDURE — 97110 THERAPEUTIC EXERCISES: CPT | Performed by: PHYSICAL THERAPIST

## 2025-01-16 NOTE — PROGRESS NOTES
Daily Note         Today's date: 2025  Patient name: Shanita Villegas  : 1945  MRN: [id number]  Referring provider: Servando Raya DO  Dx:   Encounter Diagnosis     ICD-10-CM    1. Trochanteric bursitis of right hip  M70.61       2. Chronic pain of right knee  M25.561     G89.29       3. Iliotibial band syndrome affecting lower leg, right  M76.31       4. History of total knee arthroplasty, right  Z96.651           Subjective: Shanita Villegas reports overall feeling better.        Objective: See treatment diary below    Assessment:   progressed patient as per treatment diary. Patient with good toelrance to comprehensive strengthening program and denies pain during session.  . The goal of the current treatment is to address patient's functional limitations as well as objective findings.       Plan:  reeval in next 1-2 sessions.          Eval/ Re-eval Auth #/ Referral # Total visits Start date  Expiration date Total active units  Total manual units  PT only or  PT+OT?   2024 No auth, no visit limit         25 Reports no change in insurance over new year.               Past Medical History:   Diagnosis Date    Allergic rhinitis     GERD (gastroesophageal reflux disease)     Hypertension     Urinary tract infection                      Reeval: 25  Insurance :     Foto performed: 67/63 d/c'd     Visits: 1 2 3 4 5 6   Manual: 2024   STM/MI:prn         Joint mobs: hip w/belt                           Ther ex/NMR:         Manual stretching: hamstring, hip flexor, piriformis/glute   Performed each  performed Hamstring stretch Hamstring stretch 10 sec x 5     Rec bike/  nustep  --  Rec bike 5 min lv 1  Rec bike: 7 min lv 2 Rec bike 7 min : lv 2   Clamshells  10 x 2 10 x 2  5 sec x 15     Sidelying hip abduction   10 x  10 x sidelying  Hip abd + heel against the wall: 10 x 2  heel against the wall: 10 x 2  Sidelying: 10 x 2 Sidelying 10 x 2   SLR   10 x 2  10 x 2  5 sec x 10 x 2 rev   LAQ  2 lb 5 sec x 10  5 sec  x 10 x 2 rounds  2 lb 5 sec  x 20 2 lb 5 sec x 20     Frankie test stretch  --       hamstring stretch  10 sec x 5   W/ SOS: 10 sec x 5  manually Manually 10 sec x 5   See above            sidestepping     Green band: 3 rounds 16 feet     Monster walks         leg press: vanna  65 lb 10 x  75 lb 10 x  65 lb 10 x  75 lb 10 x   85 lb 10 x 85 lb 10 x 1  95 lb 10 x 1  Green band around knees to facilitate glutes  85 lb 10 x  95 lb 10 x  2 Green band around knees to facilitate glutes  85 lb 10 x  95 lb 10 x  2   Step ups: fwd  Lv 2 10 x 2  Lv 2 10 x 2  Lv 3 10 x 2 vanna   Reverse step downs: lv 3 10 x 2 Lv 3 10 x 2 vanna   Reverse step downs: lv 3 10 x 2 Lv 3 10 x 2 vanna   Reverse step downs: lv 3 10 x 2   Eccentric lowering off step laterally  Lv 2 10 x 2 Lv 2  Lv 3 10 x  vanna   Lv 3 10 x 2            Sit/stand with hip hinge + band    Lower to chair: green band 10 x 2 10 x 2 Green band:  10 x 2    NMR:         TA activation (stab cuff)         + marching     20 x  Standin x   +hip abd iso         +hip add iso         + alternating LE extension         SLS     5 sec x10 vanna 2 finger support 5 sec x 10 2 finger support    Glut set prone  5 sec x 20  5 sec x 20       Glute set + bridge  10 x 2 10 x 2  5 sec x 20 5 sec x 20            Therapeutic Activity:          Reevaluation                  Gait Training:                   HEP:                   Modalities         MH         ice         Total time:           Access Code: [email or web address]  URL: [email or web address]  Date: 2024  Prepared by: Vera Arriaga    Exercises  - Seated Long Arc Quad with Ankle Weight  - 1 x daily - 7 x weekly - 2 sets - 10 reps  - Supine Active Straight Leg Raise  - 1 x daily - 7 x weekly - 2 sets - 10 reps  - Clamshell  - 1 x daily - 7 x weekly - 2 sets - 10 reps  - Sidelying Hip Abduction  - 1 x daily - 7 x weekly - 1 sets - 10 reps  - Supine Hamstring Stretch with  Strap  - 1 x daily - 7 x weekly - 1 sets - 5 reps - 10 sec  hold

## 2025-01-23 ENCOUNTER — OFFICE VISIT (OUTPATIENT)
Dept: PHYSICAL THERAPY | Facility: CLINIC | Age: 80
End: 2025-01-23
Payer: MEDICARE

## 2025-01-23 DIAGNOSIS — M25.561 CHRONIC PAIN OF RIGHT KNEE: ICD-10-CM

## 2025-01-23 DIAGNOSIS — G89.29 CHRONIC PAIN OF RIGHT KNEE: ICD-10-CM

## 2025-01-23 DIAGNOSIS — M70.61 TROCHANTERIC BURSITIS OF RIGHT HIP: Primary | ICD-10-CM

## 2025-01-23 DIAGNOSIS — Z96.651 HISTORY OF TOTAL KNEE ARTHROPLASTY, RIGHT: ICD-10-CM

## 2025-01-23 DIAGNOSIS — M76.31 ILIOTIBIAL BAND SYNDROME AFFECTING LOWER LEG, RIGHT: ICD-10-CM

## 2025-01-23 PROCEDURE — 97110 THERAPEUTIC EXERCISES: CPT | Performed by: PHYSICAL THERAPIST

## 2025-01-23 PROCEDURE — 97530 THERAPEUTIC ACTIVITIES: CPT | Performed by: PHYSICAL THERAPIST

## 2025-01-23 NOTE — PROGRESS NOTES
Daily Note/Progress Note      Today's date: 2025  Patient name: Shanita Villegas  : 1945  MRN: [id number]  Referring provider: Servando Raya DO  Dx:   Encounter Diagnosis     ICD-10-CM    1. Trochanteric bursitis of right hip  M70.61       2. Chronic pain of right knee  M25.561     G89.29       3. Iliotibial band syndrome affecting lower leg, right  M76.31       4. History of total knee arthroplasty, right  Z96.651           Subjective: Pt reports % improvement since SOC: 70%.  The last 30% is due to mild pain still present lateral knee and hip, wakes her less then prior to PT.  Can now sleep through the night.  Pain level at rest:  0 /10, pain level with ADLS:  2 /10.  At this time, the functional limitations include: difficulty kneeling       Objective: See treatment diary below (25)    Patient goal(s) for physical therapy is: less to no pain and feeling more comfortable, to improved her fatigue with activity.    Concurrent Complaints:  Red flags present: occasional urinary leakage  (has had previous surgery for this)  Has lost 15 pounds in past 6 months, possible due to stress of moving.     Upper Motor Neuron testing (present/not present):  Allen (middle finger first segment flick + sign is first and second finger to flexion): neg  Inverted supinator ( reflex hammer to supinator normal: neg is wrist extension, + wrist and finger flexion) : 2+    Reflexes: NT    Posture   Standing: slight right knee flexion , forward head , mild genu valgum on right ( stillmild right knee flexion present)    LLD:iliac crest =    Skin creases:neg    Shift:neg    Scoliosis: neg        Gait: reduced stance time on right, inc pronation (reduced deviations)   Assistive device: neg   Trunk movement: wnl   Stride length: wnl   Trendelenburg: neg   Foot drop: neg    Functional movements:   Squat: + genu valgum on right , but able to tap and stand without UE assistance (+)pain knee (reduced genu valgum on right,  fatigues quickly with repeated sit/stand)    SLS: NT   Bridging: able to lift 75% (-) pain (wnl)   Transfers sit/stand: independent   bed mobility independent    Lumbar AROM: (status quo)    Flexion: wnl LOM (-) pain   Extension: moderate LOM (-) pain   Side bend: Right: wnl LOM (-) pain     Left: wnl LOM (-) pain         Repeated motions: BL no pain   Standing flexion:    Effect: NE  Standing extension:   Effect: NE      Palpation: + TTP over right greater trochanter, right lateral knee joint ( + TTP lateral hip on right, no longer lateral knee)    LE MMT    1/23/25: right/left   L Hip Flexion: 4-/5  R Hip Flexion: 4+/5 4-/5    L Hip Extension: 4/5 R Hip Extension: 4/5 (status quo)    L Hip Abduction: 4+/5 R Hip Abduction: 3+/5 4/5, 4+/5   L Hip Adduction: 4+/5 R Hip Adduction: 4+/5 (status quo)    L hip ER: 4-/5 R hip ER: 3+/5 (clamshells) 4/5, 4/5    L hip IR 4/5 R hip IR: 4/5 (status quo)    L Knee Extension: 4+/5 R Knee Extension: 4/5 (status quo)    L Knee Flexion: 4+/5 R Knee Flexion: 4/5 (status quo)    L Ankle DF: 4/5 R Ankle DF: 4/5 (status quo)    L Ankle PF: 4/5  R Ankle PF: 4/5 (status quo)        LE ROM    1/23/25 right:    L hip flexion: wnl degrees R hip flexion: wnl degrees wnl   L hip extension: wnl degrees R hip extension: wnl degrees wnl   L hip IR: wnl degrees  R hip IR: 20 degrees  Right: 40 deg   L hip ER: wnl degrees  R hip ER: wnl degrees  Wnl    L hip abduction: wnl degrees  R hip abduction: 25 degrees  Right: 35    L knee flex: 149 R knee flexion: 120  Wnl    L knee ext: wnl R knee ext: -2  Right: 0        Dermatomes: wnl to light touch      Flexibility:  Hip flexors:Right: fair  Left: fair   (status quo)      FOTO score is 51% with a 63% prediction in function. (67)     Assessment: Shanita Villegas demonstrates signficant improvement since SOC with improved hip strength, improved ROM and reduced gait deviations and improved function. Patient still has strength limitations and pain present  which she will continue to benefit from skilled PT to improve strength education patient on form and improve function. .  The goal status of Shanita Villegas is indicated above .      STG (1/23/25)  + Patient will report a 35% improvement in symptoms (2-3 weeks) met  + Patient will be independent in basic HEP (2-3 weeks) met  + Patient will report increased ease climbing stairs due to a reduction in pain (2-3 weeks) still progressing      LTG:  + Patient will report a 65% improvement in symptoms (4-6 weeks) met new goal: patient will report 80% improvement 4 weeks)  + Patient will increase FOTO score by 10 points (8 sessions) met  + Patient will be independent in comprehensive HEP (4-6 weeks) still progressing  + Patient will demonstrate an increase in range of motion  right hip   to  within normal limits  (4-6 weeks) still progressing  + Patient will demonstrate increase  MMT of  hip/knee to  4+/5 for maximum function. (4-6 weeks) still progressing  + Patient will report no functional limitations (4-6 weeks) not met     Plan  Plan details: HEP development, stretching as needed per objective findings, strengthening core/lower quadrant, A/AA/PROM lumbar/hip motions, joint mobilizations prn, posture education, STM/MI as needed to reduce muscle tension per objective findings, muscle reeducation per objective findings, dynamic stabilization, PLOC discussed and agreed upon with patient , focus on glute, hip strengthening, ktape for navicular sling    Patient would benefit from: Skilled PT  Planned therapy interventions: Abdominal trunk stabilization, Balance, Gait training, HEP, Joint mobilization, Manual therapy, Neuromuscular re-education, Patient education, Postural training, Strengthening, Stretching, Therapeutic exercises, and Activity modification  Frequency: 1-2 x week  Duration in weeks: 6 weeks  Plan of Care beginning date: 1/23/25  Plan of Care expiration date: 6 weeks - 3/15/25            Eval/ Re-eval Auth #/  Referral # Total visits Start date  Expiration date Total active units  Total manual units  PT only or  PT+OT?   12/19/2024 No auth, no visit limit         1/6/25 Reports no change in insurance over new year.               Past Medical History:   Diagnosis Date    Allergic rhinitis     GERD (gastroesophageal reflux disease)     Hypertension     Urinary tract infection                      Reeval: 1/19/25  Insurance :    Foto performed: 67/63 d/c'd      Visits: 2 3 4 5 6 7   Manual: 12/23/24 1/6/25 1/9/25 1/13/25 1/16/25 1/23/25   STM/MI:prn         Joint mobs: hip w/belt      Reeval performed                     Ther ex/NMR:         Manual stretching: hamstring, hip flexor, piriformis/glute  Performed each  performed Hamstring stretch Hamstring stretch 10 sec x 5   --   Rec bike/  nustep --  Rec bike 5 min lv 1  Rec bike: 7 min lv 2 Rec bike 7 min : lv 2 Elliptical machine: 5 min lv 1   Clamshells 10 x 2 10 x 2  5 sec x 15   5 sec x15    Sidelying hip abduction  10 x  10 x sidelying  Hip abd + heel against the wall: 10 x 2  heel against the wall: 10 x 2  Sidelying: 10 x 2 Sidelying 10 x 2 Sidelying: 10 x 2    SLR 10 x 2  10 x 2  5 sec x 10 x 2 rev    LAQ 2 lb 5 sec x 10  5 sec  x 10 x 2 rounds  2 lb 5 sec  x 20 2 lb 5 sec x 20   3 lb 10 x 2   Frankie test stretch --        hamstring stretch 10 sec x 5   W/ SOS: 10 sec x 5  manually Manually 10 sec x 5   See above W/ SOS: 10 sec x 5     IT band stretch       W/ SOS: 10 sec x 5     sidestepping    Green band: 3 rounds 16 feet      Monster walks         leg press: vanna 65 lb 10 x  75 lb 10 x  65 lb 10 x  75 lb 10 x   85 lb 10 x 85 lb 10 x 1  95 lb 10 x 1  Green band around knees to facilitate glutes  85 lb 10 x  95 lb 10 x  2 Green band around knees to facilitate glutes  85 lb 10 x  95 lb 10 x  2 No band:    Step ups: fwd Lv 2 10 x 2  Lv 2 10 x 2  Lv 3 10 x 2 vanna   Reverse step downs: lv 3 10 x 2 Lv 3 10 x 2 vanna   Reverse step downs: lv 3 10 x 2 Lv 3 10 x 2 vanna   Reverse  step downs: lv 3 10 x 2 LV 3:    Eccentric lowering off step laterally Lv 2 10 x 2 Lv 2  Lv 3 10 x  vanna   Lv 3 10 x 2    Hip flexion seated      3 lb 10 x 2   Sit/stand with hip hinge + band   Lower to chair: green band 10 x 2 10 x 2 Green band:  10 x 2     NMR:         TA activation (stab cuff)         + marching    20 x  Standin x    +hip abd iso         +hip add iso         + alternating LE extension         SLS    5 sec x10 vanna 2 finger support 5 sec x 10 2 finger support     Glut set prone 5 sec x 20  5 sec x 20        Glute set + bridge 10 x 2 10 x 2  5 sec x 20 5 sec x 20             Therapeutic Activity:          Reevaluation                  Gait Training:                   HEP:                   Modalities         MH         ice         Total time:           Access Code: [email or web address]  URL: [email or web address]  Date: 2024  Prepared by: Vera Arraiga    Exercises  - Seated Long Arc Quad with Ankle Weight  - 1 x daily - 7 x weekly - 2 sets - 10 reps  - Supine Active Straight Leg Raise  - 1 x daily - 7 x weekly - 2 sets - 10 reps  - Clamshell  - 1 x daily - 7 x weekly - 2 sets - 10 reps  - Sidelying Hip Abduction  - 1 x daily - 7 x weekly - 1 sets - 10 reps  - Supine Hamstring Stretch with Strap  - 1 x daily - 7 x weekly - 1 sets - 5 reps - 10 sec  hold       Access Code: [email or web address]  URL: [email or web address]  Date: 2025  Prepared by: Vera Arriaga    Exercises  - Supine ITB Stretch with Strap  - 1 x daily - 7 x weekly - 1 sets - 5 reps - 10 sec hold  - Seated March  - 1 x daily - 7 x weekly - 2 sets - 10 reps

## 2025-01-27 ENCOUNTER — OFFICE VISIT (OUTPATIENT)
Dept: PHYSICAL THERAPY | Facility: CLINIC | Age: 80
End: 2025-01-27
Payer: MEDICARE

## 2025-01-27 DIAGNOSIS — M25.561 CHRONIC PAIN OF RIGHT KNEE: ICD-10-CM

## 2025-01-27 DIAGNOSIS — M70.61 TROCHANTERIC BURSITIS OF RIGHT HIP: Primary | ICD-10-CM

## 2025-01-27 DIAGNOSIS — M76.31 ILIOTIBIAL BAND SYNDROME AFFECTING LOWER LEG, RIGHT: ICD-10-CM

## 2025-01-27 DIAGNOSIS — G89.29 CHRONIC PAIN OF RIGHT KNEE: ICD-10-CM

## 2025-01-27 PROCEDURE — 97110 THERAPEUTIC EXERCISES: CPT | Performed by: PHYSICAL THERAPIST

## 2025-01-27 NOTE — PROGRESS NOTES
Daily Note         Today's date: 2025  Patient name: Shanita Villegas  : 1945  MRN: [id number]  Referring provider: Servando Raya DO  Dx:   Encounter Diagnosis     ICD-10-CM    1. Trochanteric bursitis of right hip  M70.61       2. Chronic pain of right knee  M25.561     G89.29       3. Iliotibial band syndrome affecting lower leg, right  M76.31           Subjective: Shanita Villegas reports no new complaints       Objective: See treatment diary below    Assessment:   progressed program as per treatment diary.  . Patient fatigued quickly especially with sidelying hip abd. Patient was able to progress SLS to no UE support but limited to 4-5 sec. Close UE support needed to be close by for balance correction when needed. Patient was able to progress to unilateral leg press without issue.     Plan:  progress strengthening and stretching. Progressing as tolerated.            Eval/ Re-eval Auth #/ Referral # Total visits Start date  Expiration date Total active units  Total manual units  PT only or  PT+OT?   2024 No auth, no visit limit         25 Reports no change in insurance over new year.               Past Medical History:   Diagnosis Date    Allergic rhinitis     GERD (gastroesophageal reflux disease)     Hypertension     Urinary tract infection                      Reeval: 25  Insurance :   Foto performed: 67/63 d/c'd       Visits: 3 4 5 6 7 8   Manual: 25   STM/MI:prn         Joint mobs: hip w/belt     Reeval performed                      Ther ex/NMR:         Manual stretching: hamstring, hip flexor, piriformis/glute Performed each  performed Hamstring stretch Hamstring stretch 10 sec x 5   --    Rec bike/  nustep  Rec bike 5 min lv 1  Rec bike: 7 min lv 2 Rec bike 7 min : lv 2 Elliptical machine: 5 min lv 1 Elliptical lv 1 5 min    Clamshells 10 x 2  5 sec x 15   5 sec x15  Red band: 15 x    Sidelying hip abduction  10 x  sidelying  Hip abd + heel against the wall: 10 x 2  heel against the wall: 10 x 2  Sidelying: 10 x 2 Sidelying 10 x 2 Sidelying: 10 x 2  Up and over cone: 10 X 2   SLR 10 x 2  5 sec x 10 x 2 rev  Up and over cone: 10 x 2   LAQ 5 sec  x 10 x 2 rounds  2 lb 5 sec  x 20 2 lb 5 sec x 20   3 lb 10 x 2    Frankie test stretch      10 sec x 5     hamstring stretch W/ SOS: 10 sec x 5  manually Manually 10 sec x 5   See above W/ SOS: 10 sec x 5      IT band stretch      W/ SOS: 10 sec x 5      sidestepping   Green band: 3 rounds 16 feet       Monster walks         leg press: vanna 65 lb 10 x  75 lb 10 x   85 lb 10 x 85 lb 10 x 1  95 lb 10 x 1  Green band around knees to facilitate glutes  85 lb 10 x  95 lb 10 x  2 Green band around knees to facilitate glutes  85 lb 10 x  95 lb 10 x  2 No band: 85 lb 10 x  95 lb 10 x  2 85 lb 10 x  95 lb 10 x  2  Right only; 45 lb 10x 2    Step ups: fwd Lv 2 10 x 2  Lv 3 10 x 2 vanna   Reverse step downs: lv 3 10 x 2 Lv 3 10 x 2 vanna   Reverse step downs: lv 3 10 x 2 Lv 3 10 x 2 vanna   Reverse step downs: lv 3 10 x 2 LV 3:     Eccentric lowering off step laterally Lv 2  Lv 3 10 x  vanna   Lv 3 10 x 2  LV 3     Hip flexion seated     3 lb 10 x 2 Banded hip flexion against the wall: red looped band: 10 x 2   Sit/stand with hip hinge + band  Lower to chair: green band 10 x 2 10 x 2 Green band:  10 x 2   Green:    NMR:         TA activation (stab cuff)         + marching   20 x  Standin x  above   +hip abd iso         +hip add iso         + alternating LE extension         SLS   5 sec x10 vanna 2 finger support 5 sec x 10 2 finger support   5 sec x 10  no UE support    Glut set prone 5 sec x 20         Glute set + bridge 10 x 2  5 sec x 20 5 sec x 20  + red hip abd iso: 5 sec x 10    + hip add iso: 5 sec x 10               Therapeutic Activity:          Reevaluation                  Gait Training:                   HEP:                   Modalities         MH         ice         Total time:            Access Code: [email or web address]  URL: [email or web address]  Date: 12/23/2024  Prepared by: Vera Arriaga    Exercises  - Seated Long Arc Quad with Ankle Weight  - 1 x daily - 7 x weekly - 2 sets - 10 reps  - Supine Active Straight Leg Raise  - 1 x daily - 7 x weekly - 2 sets - 10 reps  - Clamshell  - 1 x daily - 7 x weekly - 2 sets - 10 reps  - Sidelying Hip Abduction  - 1 x daily - 7 x weekly - 1 sets - 10 reps  - Supine Hamstring Stretch with Strap  - 1 x daily - 7 x weekly - 1 sets - 5 reps - 10 sec  hold       Access Code: [email or web address]  URL: [email or web address]  Date: 01/23/2025  Prepared by: Vera Arriaga    Exercises  - Supine ITB Stretch with Strap  - 1 x daily - 7 x weekly - 1 sets - 5 reps - 10 sec hold  - Seated March  - 1 x daily - 7 x weekly - 2 sets - 10 reps

## 2025-01-30 ENCOUNTER — OFFICE VISIT (OUTPATIENT)
Dept: PHYSICAL THERAPY | Facility: CLINIC | Age: 80
End: 2025-01-30
Payer: MEDICARE

## 2025-01-30 DIAGNOSIS — M25.561 CHRONIC PAIN OF RIGHT KNEE: ICD-10-CM

## 2025-01-30 DIAGNOSIS — Z96.651 HISTORY OF TOTAL KNEE ARTHROPLASTY, RIGHT: ICD-10-CM

## 2025-01-30 DIAGNOSIS — G89.29 CHRONIC PAIN OF RIGHT KNEE: ICD-10-CM

## 2025-01-30 DIAGNOSIS — M76.31 ILIOTIBIAL BAND SYNDROME AFFECTING LOWER LEG, RIGHT: ICD-10-CM

## 2025-01-30 DIAGNOSIS — M70.61 TROCHANTERIC BURSITIS OF RIGHT HIP: Primary | ICD-10-CM

## 2025-01-30 PROCEDURE — 97110 THERAPEUTIC EXERCISES: CPT | Performed by: PHYSICAL THERAPIST

## 2025-01-30 NOTE — PROGRESS NOTES
Daily Note         Today's date: 2025  Patient name: Shanita Villegas  : 1945  MRN: [id number]  Referring provider: Servando Raya DO  Dx:   Encounter Diagnosis     ICD-10-CM    1. Trochanteric bursitis of right hip  M70.61       2. Chronic pain of right knee  M25.561     G89.29       3. Iliotibial band syndrome affecting lower leg, right  M76.31       4. History of total knee arthroplasty, right  Z96.651           Subjective: Shanita Villegas reports overall she is feeling well and is interested in joining gym.        Objective: See treatment diary below    Assessment:   treatment session spent instructing patient in gym based program including proper set up of gym equipment and proper weights. Patient dmeonstrated a good understanding but needs cuing for equipment adjustments. Will follow up for 1 more session to issued comprehensive HEP then d/c .       Plan:  d/c next loree          Eval/ Re-eval Auth #/ Referral # Total visits Start date  Expiration date Total active units  Total manual units  PT only or  PT+OT?   2024 No auth, no visit limit         25 Reports no change in insurance over new year.               Past Medical History:   Diagnosis Date    Allergic rhinitis     GERD (gastroesophageal reflux disease)     Hypertension     Urinary tract infection                      Reeval: 25  Insurance :   Foto performed:  d/c'd        Visits: 3 4 5 6 7 8 9   Manual: 25   STM/MI:prn          Joint mobs: hip w/belt     Reeval performed                         Ther ex/NMR:          Manual stretching: hamstring, hip flexor, piriformis/glute Performed each  performed Hamstring stretch Hamstring stretch 10 sec x 5   --     Rec bike/  nustep  Rec bike 5 min lv 1  Rec bike: 7 min lv 2 Rec bike 7 min : lv 2 Elliptical machine: 5 min lv 1 Elliptical lv 1 5 min  Elliptical lv 1 5 min    Clamshells 10 x 2  5 sec x 15   5 sec x15   Red band: 15 x     Sidelying hip abduction  10 x sidelying  Hip abd + heel against the wall: 10 x 2  heel against the wall: 10 x 2  Sidelying: 10 x 2 Sidelying 10 x 2 Sidelying: 10 x 2  Up and over cone: 10 X 2    SLR 10 x 2  5 sec x 10 x 2 rev  Up and over cone: 10 x 2    LAQ 5 sec  x 10 x 2 rounds  2 lb 5 sec  x 20 2 lb 5 sec x 20   3 lb 10 x 2     Frankie test stretch      10 sec x 5      hamstring stretch W/ SOS: 10 sec x 5  manually Manually 10 sec x 5   See above W/ SOS: 10 sec x 5       IT band stretch      W/ SOS: 10 sec x 5    Hip abd/add 30 lb 10 x 2   sidestepping   Green band: 3 rounds 16 feet     Seated leg extension 30 lb 10 x 2   Monster walks       Hamstring curl machine: 40 lb 10x 2   leg press: vanna 65 lb 10 x  75 lb 10 x   85 lb 10 x 85 lb 10 x 1  95 lb 10 x 1  Green band around knees to facilitate glutes  85 lb 10 x  95 lb 10 x  2 Green band around knees to facilitate glutes  85 lb 10 x  95 lb 10 x  2 No band: 85 lb 10 x  95 lb 10 x  2 85 lb 10 x  95 lb 10 x  2  Right only; 45 lb 10x 2  Gym leg press: 85 lb: 10 x 2  95 lb 10 x   Right only: 50 lb : 10 x    Step ups: fwd Lv 2 10 x 2  Lv 3 10 x 2 vanna   Reverse step downs: lv 3 10 x 2 Lv 3 10 x 2 vanna   Reverse step downs: lv 3 10 x 2 Lv 3 10 x 2 vanna   Reverse step downs: lv 3 10 x 2 LV 3:      Eccentric lowering off step laterally Lv 2  Lv 3 10 x  vanna   Lv 3 10 x 2  LV 3      Hip flexion seated     3 lb 10 x 2 Banded hip flexion against the wall: red looped band: 10 x 2    Sit/stand with hip hinge + band  Lower to chair: green band 10 x 2 10 x 2 Green band:  10 x 2   Green:     NMR:          TA activation (stab cuff)          + marching   20 x  Standin x  above    +hip abd iso          +hip add iso          + alternating LE extension          SLS   5 sec x10 vanna 2 finger support 5 sec x 10 2 finger support   5 sec x 10  no UE support     Glut set prone 5 sec x 20          Glute set + bridge 10 x 2  5 sec x 20 5 sec x 20  + red hip abd iso: 5 sec  x 10    + hip add iso: 5 sec x 10                 Therapeutic Activity:           Reevaluation                    Gait Training:                     HEP:                     Modalities          MH          ice          Total time:            Access Code: [email or web address]  URL: [email or web address]  Date: 12/23/2024  Prepared by: Vera Arriaga    Exercises  - Seated Long Arc Quad with Ankle Weight  - 1 x daily - 7 x weekly - 2 sets - 10 reps  - Supine Active Straight Leg Raise  - 1 x daily - 7 x weekly - 2 sets - 10 reps  - Clamshell  - 1 x daily - 7 x weekly - 2 sets - 10 reps  - Sidelying Hip Abduction  - 1 x daily - 7 x weekly - 1 sets - 10 reps  - Supine Hamstring Stretch with Strap  - 1 x daily - 7 x weekly - 1 sets - 5 reps - 10 sec  hold       Access Code: [email or web address]  URL: [email or web address]  Date: 01/23/2025  Prepared by: Vera Arriaga    Exercises  - Supine ITB Stretch with Strap  - 1 x daily - 7 x weekly - 1 sets - 5 reps - 10 sec hold  - Seated March  - 1 x daily - 7 x weekly - 2 sets - 10 reps

## 2025-02-13 ENCOUNTER — OFFICE VISIT (OUTPATIENT)
Dept: PHYSICAL THERAPY | Facility: CLINIC | Age: 80
End: 2025-02-13
Payer: MEDICARE

## 2025-02-13 DIAGNOSIS — M25.561 CHRONIC PAIN OF RIGHT KNEE: ICD-10-CM

## 2025-02-13 DIAGNOSIS — G89.29 CHRONIC PAIN OF RIGHT KNEE: ICD-10-CM

## 2025-02-13 DIAGNOSIS — M70.61 TROCHANTERIC BURSITIS OF RIGHT HIP: Primary | ICD-10-CM

## 2025-02-13 DIAGNOSIS — M76.31 ILIOTIBIAL BAND SYNDROME AFFECTING LOWER LEG, RIGHT: ICD-10-CM

## 2025-02-13 PROCEDURE — 97110 THERAPEUTIC EXERCISES: CPT | Performed by: PHYSICAL THERAPIST

## 2025-02-13 NOTE — PROGRESS NOTES
Daily Note/Progress Note      Today's date: 2025  Patient name: Shanita Villegas  : 1945  MRN: [id number]  Referring provider: Servando Raya DO  Dx:   Encounter Diagnosis     ICD-10-CM    1. Trochanteric bursitis of right hip  M70.61       2. Chronic pain of right knee  M25.561     G89.29       3. Iliotibial band syndrome affecting lower leg, right  M76.31           Subjective: Pt reports % improvement since SOC: 95%.  The last 5% is due to still experiencing pain in right knee with descending stairs.  Was improving but has recently increased.  May be secondary to small hiatus in PT.   Pain level at rest:  0 /10, pain level with ADLS:  0 /10, .  At this time, the functional limitations include: knee  pain with descending stairs  , is able to complete stairs but has some pain . Is still doing stairs reciprically.     Objective: See treatment diary below       STG (25)(25)  + Patient will report a 35% improvement in symptoms (2-3 weeks) met  + Patient will be independent in basic HEP (2-3 weeks) met  + Patient will report increased ease climbing stairs due to a reduction in pain (2-3 weeks) still progressing (met)      LTG:  + Patient will report a 65% improvement in symptoms (4-6 weeks) met new goal: patient will report 80% improvement 4 weeks) (met)  + Patient will increase FOTO score by 10 points (8 sessions) met  + Patient will be independent in comprehensive HEP (4-6 weeks) still progressing (met)  + Patient will demonstrate an increase in range of motion  right hip   to  within normal limits  (4-6 weeks) still progressing (note met)  + Patient will demonstrate increase  MMT of  hip/knee to  4+/5 for maximum function. (4-6 weeks) still progressing ( not met)  + Patient will report no functional limitations (4-6 weeks) not met  ( not met)        Assessment: Shanita Villegas demonstrates signficant improvement since SOC. Only residual symptom  is right knee pain with descending stairs.  Patient will continue to benefit form HEP/Gym program with the understanding pain should improve with increased quad strengthening. Patient vocalized understanding.  Patient was issued upgraded HEP in written form from which she will continue to benefit from.   The goal status of Shanita Villegas is indicated above . Please refer to most recent reeval for objective informaitn.    Plan:  d/c to HEp          Eval/ Re-eval Auth #/ Referral # Total visits Start date  Expiration date Total active units  Total manual units  PT only or  PT+OT?   12/19/2024 No auth, no visit limit         1/6/25 Reports no change in insurance over new year.               Past Medical History:   Diagnosis Date    Allergic rhinitis     GERD (gastroesophageal reflux disease)     Hypertension     Urinary tract infection                      Reeval: 1/19/25  Insurance :   Foto performed: 67/63 d/c'd         Visits: 3 4 5 6 7 8 9 10   Manual: 1/6/25 1/9/25 1/13/25 1/16/25 1/23/25 1/27/25 1/30/25 2/13/25   STM/MI:prn           Joint mobs: hip w/belt     Reeval performed                            Ther ex/NMR:           Manual stretching: hamstring, hip flexor, piriformis/glute Performed each  performed Hamstring stretch Hamstring stretch 10 sec x 5   --      Rec bike/  nustep  Rec bike 5 min lv 1  Rec bike: 7 min lv 2 Rec bike 7 min : lv 2 Elliptical machine: 5 min lv 1 Elliptical lv 1 5 min  Elliptical lv 1 5 min  NS; lv 4 5 min      Clamshells 10 x 2  5 sec x 15   5 sec x15  Red band: 15 x      Sidelying hip abduction  10 x sidelying  Hip abd + heel against the wall: 10 x 2  heel against the wall: 10 x 2  Sidelying: 10 x 2 Sidelying 10 x 2 Sidelying: 10 x 2  Up and over cone: 10 X 2     SLR 10 x 2  5 sec x 10 x 2 rev  Up and over cone: 10 x 2     LAQ 5 sec  x 10 x 2 rounds  2 lb 5 sec  x 20 2 lb 5 sec x 20   3 lb 10 x 2      Frankie test stretch      10 sec x 5       hamstring stretch W/ SOS: 10 sec x 5  manually Manually 10 sec x 5   See above W/  SOS: 10 sec x 5        IT band stretch      W/ SOS: 10 sec x 5    Hip abd/add 30 lb 10 x 2    sidestepping   Green band: 3 rounds 16 feet     Seated leg extension 30 lb 10 x 2 22 lb 10 x2    Monster walks       Hamstring curl machine: 40 lb 10x 2 22 lb 10 x   11 lb 10 x    leg press: vanna 65 lb 10 x  75 lb 10 x   85 lb 10 x 85 lb 10 x 1  95 lb 10 x 1  Green band around knees to facilitate glutes  85 lb 10 x  95 lb 10 x  2 Green band around knees to facilitate glutes  85 lb 10 x  95 lb 10 x  2 No band: 85 lb 10 x  95 lb 10 x  2 85 lb 10 x  95 lb 10 x  2  Right only; 45 lb 10x 2  Gym leg press: 85 lb: 10 x 2  95 lb 10 x   Right only: 50 lb : 10 x  Gym leg press: 85 lb: 10 x 2  95 lb 10 x      Step ups: fwd Lv 2 10 x 2  Lv 3 10 x 2 vanna   Reverse step downs: lv 3 10 x 2 Lv 3 10 x 2 vanna   Reverse step downs: lv 3 10 x 2 Lv 3 10 x 2 vanna   Reverse step downs: lv 3 10 x 2 LV 3:       Eccentric lowering off step laterally Lv 2  Lv 3 10 x  vanna   Lv 3 10 x 2  LV 3       Hip flexion seated     3 lb 10 x 2 Banded hip flexion against the wall: red looped band: 10 x 2     Sit/stand with hip hinge + band  Lower to chair: green band 10 x 2 10 x 2 Green band:  10 x 2   Green:      NMR:           TA activation (stab cuff)           + marching   20 x  Standin x  above     +hip abd iso           +hip add iso           + alternating LE extension           SLS   5 sec x10 vanna 2 finger support 5 sec x 10 2 finger support   5 sec x 10  no UE support      Glut set prone 5 sec x 20           Glute set + bridge 10 x 2  5 sec x 20 5 sec x 20  + red hip abd iso: 5 sec x 10    + hip add iso: 5 sec x 10                   Therapeutic Activity:            Reevaluation                      Gait Training:                       HEP:                       Modalities           MH           ice           Total time:             Access Code: [email or web address]  URL: [email or web address]  Date: 2024  Prepared by: Vera  Arriaga    Exercises  - Seated Long Arc Quad with Ankle Weight  - 1 x daily - 7 x weekly - 2 sets - 10 reps  - Supine Active Straight Leg Raise  - 1 x daily - 7 x weekly - 2 sets - 10 reps  - Clamshell  - 1 x daily - 7 x weekly - 2 sets - 10 reps  - Sidelying Hip Abduction  - 1 x daily - 7 x weekly - 1 sets - 10 reps  - Supine Hamstring Stretch with Strap  - 1 x daily - 7 x weekly - 1 sets - 5 reps - 10 sec  hold       Access Code: [email or web address]  URL: [email or web address]  Date: 01/23/2025  Prepared by: Vera Arriaga    Exercises  - Supine ITB Stretch with Strap  - 1 x daily - 7 x weekly - 1 sets - 5 reps - 10 sec hold  - Seated March  - 1 x daily - 7 x weekly - 2 sets - 10 reps         Access Code: [email or web address]  URL: [email or web address]  Date: 02/13/2025  Prepared by: Vera Arriaga    Exercises  - Backward Step Down with Counter Support  - 1 x daily - 7 x weekly - 2 sets - 10 reps  - Side Step Down with Counter Support  - 1 x daily - 7 x weekly - 2 sets - 10 reps  - Supine Hamstring Stretch with Strap  - 1 x daily - 7 x weekly - 1 sets - 5 reps - 10 sec hold  - Hooklying Clamshell with Resistance  - 1 x daily - 7 x weekly - 2 sets - 10 reps  - Sidelying Hip Abduction  - 1 x daily - 7 x weekly - 2 sets - 10 reps  - Active Straight Leg Raise with Quad Set  - 1 x daily - 7 x weekly - 2 sets - 10 reps  - Seated Long Arc Quad  - 1 x daily - 7 x weekly - 3 sets - 10 reps  - Sit to Stand  - 1 x daily - 7 x weekly - 2 sets - 10 reps

## 2025-03-10 DIAGNOSIS — K21.9 GASTROESOPHAGEAL REFLUX DISEASE WITHOUT ESOPHAGITIS: Primary | ICD-10-CM

## 2025-03-10 DIAGNOSIS — E78.2 MIXED HYPERLIPIDEMIA: ICD-10-CM

## 2025-03-11 RX ORDER — ROSUVASTATIN CALCIUM 5 MG/1
5 TABLET, COATED ORAL DAILY
Qty: 30 TABLET | Refills: 0 | Status: SHIPPED | OUTPATIENT
Start: 2025-03-11

## 2025-03-12 RX ORDER — OMEPRAZOLE 20 MG/1
20 CAPSULE, DELAYED RELEASE ORAL DAILY
Qty: 90 CAPSULE | Refills: 1 | Status: SHIPPED | OUTPATIENT
Start: 2025-03-12

## 2025-03-13 ENCOUNTER — OFFICE VISIT (OUTPATIENT)
Dept: OBGYN CLINIC | Facility: CLINIC | Age: 80
End: 2025-03-13
Payer: MEDICARE

## 2025-03-13 VITALS — HEIGHT: 65 IN | BODY MASS INDEX: 24.66 KG/M2 | WEIGHT: 148 LBS

## 2025-03-13 DIAGNOSIS — M70.61 TROCHANTERIC BURSITIS OF RIGHT HIP: Primary | ICD-10-CM

## 2025-03-13 DIAGNOSIS — M76.31 ILIOTIBIAL BAND SYNDROME AFFECTING LOWER LEG, RIGHT: ICD-10-CM

## 2025-03-13 DIAGNOSIS — Z96.651 HISTORY OF TOTAL KNEE ARTHROPLASTY, RIGHT: ICD-10-CM

## 2025-03-13 DIAGNOSIS — M70.50 PES ANSERINE BURSITIS: ICD-10-CM

## 2025-03-13 PROCEDURE — 99214 OFFICE O/P EST MOD 30 MIN: CPT | Performed by: ORTHOPAEDIC SURGERY

## 2025-03-13 RX ORDER — DILTIAZEM HYDROCHLORIDE 240 MG/1
1 CAPSULE, COATED, EXTENDED RELEASE ORAL DAILY
COMMUNITY
Start: 2024-12-29

## 2025-03-13 NOTE — PROGRESS NOTES
Assessment/Plan:  1. Trochanteric bursitis of right hip  Ambulatory Referral to Physical Therapy      2. Iliotibial band syndrome affecting lower leg, right  Ambulatory Referral to Physical Therapy      3. Pes anserine bursitis  Ambulatory Referral to Physical Therapy      4. History of total knee arthroplasty, right  Ambulatory Referral to Physical Therapy          Scribe Attestation      I,:  Toyin Prieto am acting as a scribe while in the presence of the attending physician.:       I,:  Servando Raya, DO personally performed the services described in this documentation    as scribed in my presence.:           Shanita is a pleasant 79-year-old female who presents today for follow-up of right knee pes anserine, right hip greater trochanteric bursitis, and IT band syndrome.  Explained to the patient she does have slight instability of her right total knee arthroplasty within the arc of range of motion and this is likely contributing to her pes anserine bursitis and medial sided knee pain.  In regards to her IT band, she should continue her exercises for the knee and hip.  Explained to the patient if her lateral hip pain continues despite optimizing physical therapy in regards to her hip and knee, she should return for further reevaluation and CSI of the greater trochanteric bursa.  She may follow-up on an as-needed basis.    Subjective: Initial evaluation for right leg pain    Patient ID: Shanita Villegas is a 80 y.o. female who presents today for follow-up of right hip greater trochanteric bursitis, IT band syndrome, chronic right knee pain status post right total knee arthroplasty performed in January 2021 while in [place]. Patient was last seen on 12/13/2024 where physical therapy for the right hip and topical Voltaren gel were recommended.  Patient reports she has seen improvement with her IT band tightness and pain.  She continues to report 7 out of 10 pain located at the medial aspect of the right knee  and right greater trochanteric bursa.  Medial knee pain worsens with going up and down the stairs. She reports a history of 3 greater trochanteric CSI's performed once a year for 3 years.  She reports these did offer short-term relief of her symptoms.  She did tried topical Voltaren gel with mild relief of her symptoms, however she did discontinue this.  She presents today concerned about her medial knee pain. Patient denies back pain and numbness and tingling.    Review of Systems   Constitutional:  Positive for activity change. Negative for chills, fever and unexpected weight change.   HENT:  Negative for hearing loss, nosebleeds and sore throat.    Eyes:  Negative for pain, redness and visual disturbance.   Respiratory:  Negative for cough, shortness of breath and wheezing.    Cardiovascular:  Negative for chest pain, palpitations and leg swelling.   Gastrointestinal:  Negative for abdominal pain, nausea and vomiting.   Endocrine: Negative for polydipsia and polyuria.   Genitourinary:  Negative for dysuria and hematuria.   Musculoskeletal:  Positive for arthralgias and myalgias. Negative for joint swelling.        See HPI   Skin:  Negative for rash and wound.   Neurological:  Negative for dizziness, numbness and headaches.   Psychiatric/Behavioral:  Negative for decreased concentration and suicidal ideas. The patient is not nervous/anxious.          Past Medical History:   Diagnosis Date    Allergic rhinitis     GERD (gastroesophageal reflux disease)     Hypertension     Urinary tract infection        Past Surgical History:   Procedure Laterality Date    BLADDER REPAIR      BREAST LUMPECTOMY Right     benign    JOINT REPLACEMENT Right     TKR       Family History   Problem Relation Age of Onset    Other (chronic lymp) Mother     Coronary artery disease Father     Kidney failure Father     Hypothyroidism Sister     Liver cancer Sister     Diabetes Brother     Hypothyroidism Brother        Social History      Occupational History    Not on file   Tobacco Use    Smoking status: Never    Smokeless tobacco: Never   Vaping Use    Vaping status: Never Used   Substance and Sexual Activity    Alcohol use: Yes     Comment: social    Drug use: Never    Sexual activity: Not on file         Current Outpatient Medications:     aspirin (Aspirin 81) 81 mg EC tablet, Take 81 mg by mouth daily, Disp: , Rfl:     cetirizine (ZyrTEC) 10 mg tablet, Take 10 mg by mouth daily, Disp: , Rfl:     Cyanocobalamin (Vitamin B-12) 5000 MCG TBDP, Take by mouth, Disp: , Rfl:     diltiazem (CARDIZEM CD) 240 mg 24 hr capsule, Take 1 capsule by mouth in the morning, Disp: , Rfl:     omeprazole (PriLOSEC) 20 mg delayed release capsule, Take 1 capsule (20 mg total) by mouth daily, Disp: 90 capsule, Rfl: 1    rosuvastatin (CRESTOR) 5 mg tablet, Take 1 tablet (5 mg total) by mouth daily, Disp: 30 tablet, Rfl: 0    DILTIAZEM HCL ER PO, Take 240 mg by mouth (Patient not taking: Reported on 3/13/2025), Disp: , Rfl:     No Known Allergies    Objective:  There were no vitals filed for this visit.    Body mass index is 24.63 kg/m².    Right Knee Exam     Muscle Strength   The patient has normal right knee strength.    Tenderness   The patient is experiencing tenderness in the pes anserinus.    Range of Motion   Extension:  0   Flexion:  120     Tests   Patellar apprehension: negative    Other   Erythema: absent  Scars: present  Sensation: normal  Pulse: present  Swelling: none  Effusion: no effusion present    Comments:  Well healed anterior surgical scar  AP laxity at 90 degrees.   +1 laxity at varus and valgus stress at 30 degrees.  +1 to varus and extension.  Neurovascularly intact distally  No warmth or erythema      Right Hip Exam     Tenderness   The patient is experiencing tenderness in the greater trochanter.    Range of Motion   Abduction:  45   Adduction:  30   Flexion:  120   External rotation:  50   Internal rotation:  30     Muscle Strength    Abduction: 5/5   Adduction: 5/5   Flexion: 5/5     Tests   DICK: negative  Venu: negative    Other   Erythema: absent  Scars: absent  Sensation: normal  Pulse: present    Comments:  Stinchfield: negative  SLR: Negative  FADIR: negative            Observations     Right Knee   Negative for effusion.       Physical Exam  Vitals and nursing note reviewed.   Constitutional:       Appearance: Normal appearance. She is well-developed.   HENT:      Head: Normocephalic and atraumatic.      Right Ear: External ear normal.      Left Ear: External ear normal.   Eyes:      General: No scleral icterus.     Extraocular Movements: Extraocular movements intact.      Conjunctiva/sclera: Conjunctivae normal.   Cardiovascular:      Rate and Rhythm: Normal rate.   Pulmonary:      Effort: Pulmonary effort is normal. No respiratory distress.   Musculoskeletal:      Cervical back: Normal range of motion and neck supple.      Right knee: No effusion.      Comments: See Ortho exam   Skin:     General: Skin is warm and dry.   Neurological:      General: No focal deficit present.      Mental Status: She is alert and oriented to person, place, and time.   Psychiatric:         Behavior: Behavior normal.         I have personally reviewed pertinent films in PACS.    X-ray of the right hip obtained on 12/13/2024 reviewed demonstrating moderate to severe degenerative change with medial narrowing.  There is sclerosis and osteophytosis.  There is no acute fracture, dislocation, lytic or blastic lesion.    X-ray of the right knee obtained on 12/13/2024 reviewed demonstrating a well-positioned and aligned cemented total knee arthroplasty without evidence of failure.  There is no new fracture, dislocation, lytic or blastic lesion.    This document was created using speech voice recognition software.   Grammatical errors, random word insertions, pronoun errors, and incomplete sentences are an occasional consequence of this system due to software  limitations, ambient noise, and hardware issues.   Any formal questions or concerns about content, text, or information contained within the body of this dictation should be directly addressed to the provider for clarification.

## 2025-03-14 ENCOUNTER — TELEPHONE (OUTPATIENT)
Age: 80
End: 2025-03-14

## 2025-03-14 ENCOUNTER — APPOINTMENT (OUTPATIENT)
Dept: LAB | Facility: CLINIC | Age: 80
End: 2025-03-14
Payer: MEDICARE

## 2025-03-14 DIAGNOSIS — R73.09 ELEVATED GLUCOSE: ICD-10-CM

## 2025-03-14 DIAGNOSIS — E78.2 MIXED HYPERLIPIDEMIA: ICD-10-CM

## 2025-03-14 DIAGNOSIS — I10 PRIMARY HYPERTENSION: ICD-10-CM

## 2025-03-14 DIAGNOSIS — Z11.59 ENCOUNTER FOR HEPATITIS C SCREENING TEST FOR LOW RISK PATIENT: ICD-10-CM

## 2025-03-14 LAB
ALBUMIN SERPL BCG-MCNC: 4.1 G/DL (ref 3.5–5)
ALP SERPL-CCNC: 106 U/L (ref 34–104)
ALT SERPL W P-5'-P-CCNC: 16 U/L (ref 7–52)
ANION GAP SERPL CALCULATED.3IONS-SCNC: 10 MMOL/L (ref 4–13)
AST SERPL W P-5'-P-CCNC: 19 U/L (ref 13–39)
BILIRUB SERPL-MCNC: 0.7 MG/DL (ref 0.2–1)
BUN SERPL-MCNC: 17 MG/DL (ref 5–25)
CALCIUM SERPL-MCNC: 9.3 MG/DL (ref 8.4–10.2)
CHLORIDE SERPL-SCNC: 103 MMOL/L (ref 96–108)
CHOLEST SERPL-MCNC: 148 MG/DL (ref ?–200)
CO2 SERPL-SCNC: 27 MMOL/L (ref 21–32)
CREAT SERPL-MCNC: 0.69 MG/DL (ref 0.6–1.3)
EST. AVERAGE GLUCOSE BLD GHB EST-MCNC: 111 MG/DL
GFR SERPL CREATININE-BSD FRML MDRD: 83 ML/MIN/1.73SQ M
GLUCOSE P FAST SERPL-MCNC: 113 MG/DL (ref 65–99)
HBA1C MFR BLD: 5.5 %
HDLC SERPL-MCNC: 67 MG/DL
LDLC SERPL CALC-MCNC: 45 MG/DL (ref 0–100)
NONHDLC SERPL-MCNC: 81 MG/DL
POTASSIUM SERPL-SCNC: 4.7 MMOL/L (ref 3.5–5.3)
PROT SERPL-MCNC: 6.8 G/DL (ref 6.4–8.4)
SODIUM SERPL-SCNC: 140 MMOL/L (ref 135–147)
TRIGL SERPL-MCNC: 182 MG/DL (ref ?–150)

## 2025-03-14 PROCEDURE — 85007 BL SMEAR W/DIFF WBC COUNT: CPT

## 2025-03-14 PROCEDURE — 80061 LIPID PANEL: CPT

## 2025-03-14 PROCEDURE — 85027 COMPLETE CBC AUTOMATED: CPT

## 2025-03-14 PROCEDURE — 86803 HEPATITIS C AB TEST: CPT

## 2025-03-14 PROCEDURE — 80053 COMPREHEN METABOLIC PANEL: CPT

## 2025-03-14 PROCEDURE — 83036 HEMOGLOBIN GLYCOSYLATED A1C: CPT

## 2025-03-14 PROCEDURE — 36415 COLL VENOUS BLD VENIPUNCTURE: CPT

## 2025-03-15 LAB
BASOPHILS # BLD MANUAL: 0 THOUSAND/UL (ref 0–0.1)
BASOPHILS NFR MAR MANUAL: 0 % (ref 0–1)
DIFFERENTIAL COMMENT: ABNORMAL
EOSINOPHIL # BLD MANUAL: 0.82 THOUSAND/UL (ref 0–0.4)
EOSINOPHIL NFR BLD MANUAL: 5 % (ref 0–6)
ERYTHROCYTE [DISTWIDTH] IN BLOOD BY AUTOMATED COUNT: 12.2 % (ref 11.6–15.1)
HCT VFR BLD AUTO: 43.8 % (ref 34.8–46.1)
HCV AB SER QL: NORMAL
HGB BLD-MCNC: 14 G/DL (ref 11.5–15.4)
LYMPHOCYTES # BLD AUTO: 44 % (ref 14–44)
LYMPHOCYTES # BLD AUTO: 7.53 THOUSAND/UL (ref 0.6–4.47)
MCH RBC QN AUTO: 30.4 PG (ref 26.8–34.3)
MCHC RBC AUTO-ENTMCNC: 32 G/DL (ref 31.4–37.4)
MCV RBC AUTO: 95 FL (ref 82–98)
MONOCYTES # BLD AUTO: 0.49 THOUSAND/UL (ref 0–1.22)
MONOCYTES NFR BLD: 3 % (ref 4–12)
NEUTROPHILS # BLD MANUAL: 7.53 THOUSAND/UL (ref 1.85–7.62)
NEUTS SEG NFR BLD AUTO: 46 % (ref 43–75)
PLATELET # BLD AUTO: 248 THOUSANDS/UL (ref 149–390)
PLATELET BLD QL SMEAR: ADEQUATE
PMV BLD AUTO: 12 FL (ref 8.9–12.7)
RBC # BLD AUTO: 4.6 MILLION/UL (ref 3.81–5.12)
RBC MORPH BLD: NORMAL
SMUDGE CELLS BLD QL SMEAR: PRESENT
VARIANT LYMPHS # BLD AUTO: 2 %
WBC # BLD AUTO: 16.38 THOUSAND/UL (ref 4.31–10.16)

## 2025-03-17 ENCOUNTER — RESULTS FOLLOW-UP (OUTPATIENT)
Dept: FAMILY MEDICINE CLINIC | Facility: CLINIC | Age: 80
End: 2025-03-17

## 2025-03-17 NOTE — TELEPHONE ENCOUNTER
I called patient about her wbc and elevated lymphocyte count.  She has seen hematology in the past who recommended surveillance and thought at worst case (per patient) that she may have a smouldering CLL.  She feels well with the exception of some occasional fatigue.  She will continue surveillance and will refer to hematology if things change.

## 2025-03-18 ENCOUNTER — HOSPITAL ENCOUNTER (OUTPATIENT)
Dept: RADIOLOGY | Facility: HOSPITAL | Age: 80
Discharge: HOME | End: 2025-03-18
Payer: MEDICARE

## 2025-03-18 VITALS — WEIGHT: 148 LBS | HEIGHT: 65 IN | BODY MASS INDEX: 24.66 KG/M2

## 2025-03-18 DIAGNOSIS — Z12.39 SCREENING BREAST EXAMINATION: ICD-10-CM

## 2025-03-18 DIAGNOSIS — Z12.31 ENCOUNTER FOR SCREENING MAMMOGRAM FOR MALIGNANT NEOPLASM OF BREAST: ICD-10-CM

## 2025-03-18 PROCEDURE — 77067 SCR MAMMO BI INCL CAD: CPT

## 2025-03-18 PROCEDURE — 77063 BREAST TOMOSYNTHESIS BI: CPT

## 2025-03-25 ENCOUNTER — EVALUATION (OUTPATIENT)
Dept: PHYSICAL THERAPY | Facility: CLINIC | Age: 80
End: 2025-03-25
Payer: MEDICARE

## 2025-03-25 DIAGNOSIS — M76.31 ILIOTIBIAL BAND SYNDROME AFFECTING LOWER LEG, RIGHT: ICD-10-CM

## 2025-03-25 DIAGNOSIS — M70.61 TROCHANTERIC BURSITIS OF RIGHT HIP: ICD-10-CM

## 2025-03-25 DIAGNOSIS — M70.50 PES ANSERINE BURSITIS: ICD-10-CM

## 2025-03-25 DIAGNOSIS — Z96.651 HISTORY OF TOTAL KNEE ARTHROPLASTY, RIGHT: ICD-10-CM

## 2025-03-25 PROCEDURE — 97530 THERAPEUTIC ACTIVITIES: CPT | Performed by: PHYSICAL THERAPIST

## 2025-03-25 NOTE — PROGRESS NOTES
Reevaluation       Today's date: 3/25/2025  Patient name: Shanita Villegas  : 1945  MRN: [id number]  Referring provider: Servando Raya DO  Dx:   Encounter Diagnosis     ICD-10-CM    1. Trochanteric bursitis of right hip  M70.61 Ambulatory Referral to Physical Therapy      2. Iliotibial band syndrome affecting lower leg, right  M76.31 Ambulatory Referral to Physical Therapy      3. Pes anserine bursitis  M70.50 Ambulatory Referral to Physical Therapy      4. History of total knee arthroplasty, right  Z96.651 Ambulatory Referral to Physical Therapy          Subjective: Pt reports she had a follow up with ortho who referred her back to PT due to her right knee pain .     patient reports she was having pain on medial aspect of knee, describes a fullness, tightness with descending greater then ascending stairs. States the pain is primarily on medial aspect of knee along joint line.  Describes as sharp, transient in nature.  Can occur during sleep.  Did not take xrays . Function: difficulty with stair negotiation, painful to kneel except at [employer],     Patient goal: to not have right knee pain.      Objective: See treatment diary below      STG (25)(25)  + Patient will report a 35% improvement in symptoms (2-3 weeks) met  + Patient will be independent in basic HEP (2-3 weeks) met  + Patient will report increased ease climbing stairs due to a reduction in pain (2-3 weeks) still progressing (met)      LTG:  + Patient will report a 65% improvement in symptoms (4-6 weeks) met new goal: patient will report 80% improvement 4 weeks) (met)  + Patient will increase FOTO score by 10 points (8 sessions) met  + Patient will be independent in comprehensive HEP (4-6 weeks) still progressing (met)  + Patient will demonstrate an increase in range of motion  right hip   to  within normal limits  (4-6 weeks) still progressing (note met)  + Patient will demonstrate increase  MMT of  hip/knee to  4+/5 for maximum  "function. (4-6 weeks) still progressing ( not met)  + Patient will report no functional limitations (4-6 weeks) not met  ( not met)    New goals ( 3/25/25)  STG:  + patient will report 35 % improvement in right knee pain (3 weeks)  + independent in basic HEP (2-3 weeks)    LTG  +patient will reportc climbing stairs wihtout increase in pain (4-6 weeks)  + patient will report 75 % improvement in right knee pain (4-6 weeks)  + Patient will demonstrate increase  MMT of  hip/knee to  4+/5 for maximum function. (4-6 weeks)   + Patient will report no functional limitations (4-6 weeks)   +   Objective findings    Posture   Standing: slight right knee flexion , forward head , mild genu valgum on right ( stillmild right knee flexion present)    LLD:iliac crest =    Skin creases:neg    Shift:neg    Scoliosis: neg        Gait: reduced stance time on right, inc pronation (reduced deviations) )(inc knee flexion right greater then left, no pain when ambulating on level surfaces)   Assistive device: neg   Trunk movement: wnl   Stride length: wnl   Trendelenburg: neg   Foot drop: neg    Functional movements:   Squat: + genu valgum on right , but able to tap and stand without UE assistance (+)pain knee (reduced genu valgum on right, fatigues quickly with repeated sit/stand) (fair form, \"a little pain\" in knee)   SLS: NT  (Right: 5 sec, no pain, left: 10:58 sec)  Bridging: able to lift 75% (-) pain (wnl) ( able to lift 80% of motion)   Transfers sit/stand: independent   bed mobility independent  (Descending staris recipriclaly: + hip IR with descending using left.  No change when performed with blue Tband to facilitate hip Er's)    Lumbar AROM: (status quo)    Flexion: wnl LOM (-) pain   Extension: moderate LOM (-) pain   Side bend: Right: wnl LOM (-) pain     Left: wnl LOM (-) pain         Repeated motions: BL no pain   Standing flexion:    Effect: NE  Standing extension:   Effect: NE      Palpation: + TTP over right greater " trochanter, right lateral knee joint ( + TTP lateral hip on right, no longer lateral knee)    LE MMT    1/23/25: right/left 3/25/25 (right)   L Hip Flexion: 4-/5  R Hip Flexion: 4+/5 4-/5  4-/5 (right), left: 4/5   L Hip Extension: 4/5 R Hip Extension: 4/5 (status quo)     L Hip Abduction: 4+/5 R Hip Abduction: 3+/5 4/5, 4+/5 Left: 4/5, Right: 4-/5     L Hip Adduction: 4+/5 R Hip Adduction: 4+/5 (status quo)  Status quo   L hip ER: 4-/5 R hip ER: 3+/5 (clamshells) 4/5, 4/5     L hip IR 4/5 R hip IR: 4/5 (status quo)     L Knee Extension: 4+/5 R Knee Extension: 4/5 (status quo)  4+/5 no pain    L Knee Flexion: 4+/5 R Knee Flexion: 4/5 (status quo)  4+/5 no pain    L Ankle DF: 4/5 R Ankle DF: 4/5 (status quo)     L Ankle PF: 4/5  R Ankle PF: 4/5 (status quo)     clamshells   Right: 4-/5, left 4/5       LE ROM    1/23/25 right:  3/25/25:    L hip flexion: wnl degrees R hip flexion: wnl degrees wnl    L hip extension: wnl degrees R hip extension: wnl degrees wnl    L hip IR: wnl degrees  R hip IR: 20 degrees  Right: 40 deg    L hip ER: wnl degrees  R hip ER: wnl degrees  Wnl     L hip abduction: wnl degrees  R hip abduction: 25 degrees  Right: 35     L knee flex: 149 R knee flexion: 120  Wnl  Right 125,     L knee ext: wnl R knee ext: -2  Right: 0  Right: 0 deg       Dermatomes: wnl to light touch      Flexibility:  Hip flexors:Right: fair  Left: fair   (status quo)      FOTO score is 51% with a 63% prediction in function. (67)(49/69)            Assessment: Shanita Villegas demonstrates weakness in right hip, still pain with function, reduced stability, gait deviations and pain with functional activities. She will benefit from improved hip and quad/hamstring strength to improve dynamic stability in knee/hip..  The goal status of Shanita Villegas is indicated above   .    Plan  Plan details: HEP development, stretching as needed per objective findings, strengthening core/lower quadrant, A/AA/PROM lumbar/hip motions, joint  mobilizations prn, posture education, STM/MI as needed to reduce muscle tension per objective findings, muscle reeducation per objective findings, dynamic stabilization, PLOC discussed and agreed upon with patient , focus on glute, hip strengthening, ktape for navicular sling    Patient would benefit from: Skilled PT  Planned therapy interventions: Abdominal trunk stabilization, Balance, Gait training, HEP, Joint mobilization, Manual therapy, Neuromuscular re-education, Patient education, Postural training, Strengthening, Stretching, Therapeutic exercises, and Activity modification  Frequency: 1-2 x week  Duration in weeks: 6 weeks  Plan of Care beginning date: 3/25/25  Plan of Care expiration date: 6 weeks - 5/25/25          Eval/ Re-eval Auth #/ Referral # Total visits Start date  Expiration date Total active units  Total manual units  PT only or  PT+OT?   12/19/2024 No auth, no visit limit         1/6/25 Reports no change in insurance over new year.               Past Medical History:   Diagnosis Date    Allergic rhinitis     GERD (gastroesophageal reflux disease)     Hypertension     Urinary tract infection                      Reeval: 1/19/25  Insurance :          Visits: 6 7 8 9 10 11   Manual: 1/16/25 1/23/25 1/27/25 1/30/25 2/13/25 3/25/25   STM/MI:prn         Joint mobs: hip w/belt  Reeval performed    Reeval performed: focus on hip, glute and quad/hamstring strength                     Ther ex/NMR:         Manual stretching: hamstring, hip flexor, piriformis/glute Hamstring stretch 10 sec x 5   --       Rec bike/  nustep Rec bike 7 min : lv 2 Elliptical machine: 5 min lv 1 Elliptical lv 1 5 min  Elliptical lv 1 5 min  NS; lv 4 5 min       Clamshells  5 sec x15  Red band: 15 x       Sidelying hip abduction  Sidelying 10 x 2 Sidelying: 10 x 2  Up and over cone: 10 X 2      SLR rev  Up and over cone: 10 x 2      LAQ  3 lb 10 x 2       Frankie test stretch   10 sec x 5        hamstring stretch See above W/ SOS:  10 sec x 5         IT band stretch   W/ SOS: 10 sec x 5    Hip abd/add 30 lb 10 x 2     sidestepping    Seated leg extension 30 lb 10 x 2 22 lb 10 x2     Monster walks    Hamstring curl machine: 40 lb 10x 2 22 lb 10 x   11 lb 10 x     leg press: vanna Green band around knees to facilitate glutes  85 lb 10 x  95 lb 10 x  2 No band: 85 lb 10 x  95 lb 10 x  2 85 lb 10 x  95 lb 10 x  2  Right only; 45 lb 10x 2  Gym leg press: 85 lb: 10 x 2  95 lb 10 x   Right only: 50 lb : 10 x  Gym leg press: 85 lb: 10 x 2  95 lb 10 x       Step ups: fwd Lv 3 10 x 2 vanna   Reverse step downs: lv 3 10 x 2 LV 3:        Eccentric lowering off step laterally Lv 3 10 x 2  LV 3        Hip flexion seated  3 lb 10 x 2 Banded hip flexion against the wall: red looped band: 10 x 2      Sit/stand with hip hinge + band Green band:  10 x 2   Green:       NMR:         TA activation (stab cuff)         + marching Standin x  above      +hip abd iso         +hip add iso         + alternating LE extension         SLS 5 sec x 10 2 finger support   5 sec x 10  no UE support       Glut set prone         Glute set + bridge 5 sec x 20  + red hip abd iso: 5 sec x 10    + hip add iso: 5 sec x 10                  Therapeutic Activity:          Reevaluation                  Gait Training:                   HEP:                   Modalities         MH         ice         Total time:           Access Code: [email or web address]  URL: [email or web address]  Date: 2024  Prepared by: Vera Arriaga    Exercises  - Seated Long Arc Quad with Ankle Weight  - 1 x daily - 7 x weekly - 2 sets - 10 reps  - Supine Active Straight Leg Raise  - 1 x daily - 7 x weekly - 2 sets - 10 reps  - Clamshell  - 1 x daily - 7 x weekly - 2 sets - 10 reps  - Sidelying Hip Abduction  - 1 x daily - 7 x weekly - 1 sets - 10 reps  - Supine Hamstring Stretch with Strap  - 1 x daily - 7 x weekly - 1 sets - 5 reps - 10 sec  hold       Access Code: [email or web address]  URL:  [email or web address]  Date: 01/23/2025  Prepared by: Vera Arriaga    Exercises  - Supine ITB Stretch with Strap  - 1 x daily - 7 x weekly - 1 sets - 5 reps - 10 sec hold  - Seated March  - 1 x daily - 7 x weekly - 2 sets - 10 reps         Access Code: [email or web address]  URL: [email or web address]  Date: 02/13/2025  Prepared by: Vera Arriaga    Exercises  - Backward Step Down with Counter Support  - 1 x daily - 7 x weekly - 2 sets - 10 reps  - Side Step Down with Counter Support  - 1 x daily - 7 x weekly - 2 sets - 10 reps  - Supine Hamstring Stretch with Strap  - 1 x daily - 7 x weekly - 1 sets - 5 reps - 10 sec hold  - Hooklying Clamshell with Resistance  - 1 x daily - 7 x weekly - 2 sets - 10 reps  - Sidelying Hip Abduction  - 1 x daily - 7 x weekly - 2 sets - 10 reps  - Active Straight Leg Raise with Quad Set  - 1 x daily - 7 x weekly - 2 sets - 10 reps  - Seated Long Arc Quad  - 1 x daily - 7 x weekly - 3 sets - 10 reps  - Sit to Stand  - 1 x daily - 7 x weekly - 2 sets - 10 reps

## 2025-04-01 ENCOUNTER — OFFICE VISIT (OUTPATIENT)
Dept: PHYSICAL THERAPY | Facility: CLINIC | Age: 80
End: 2025-04-01
Payer: MEDICARE

## 2025-04-01 DIAGNOSIS — Z96.651 HISTORY OF TOTAL KNEE ARTHROPLASTY, RIGHT: ICD-10-CM

## 2025-04-01 DIAGNOSIS — M76.31 ILIOTIBIAL BAND SYNDROME AFFECTING LOWER LEG, RIGHT: Primary | ICD-10-CM

## 2025-04-01 DIAGNOSIS — M70.51 PES ANSERINUS BURSITIS OF RIGHT KNEE: ICD-10-CM

## 2025-04-01 DIAGNOSIS — M70.61 TROCHANTERIC BURSITIS OF RIGHT HIP: ICD-10-CM

## 2025-04-01 PROCEDURE — 97110 THERAPEUTIC EXERCISES: CPT | Performed by: PHYSICAL THERAPIST

## 2025-04-01 NOTE — PROGRESS NOTES
Daily Note         Today's date: 2025  Patient name: Shanita Villegas  : 1945  MRN: [id number]  Referring provider: Servando Raya DO  Dx:   Encounter Diagnosis     ICD-10-CM    1. Iliotibial band syndrome affecting lower leg, right  M76.31       2. Trochanteric bursitis of right hip  M70.61       3. Pes anserinus bursitis of right knee  M70.51       4. History of total knee arthroplasty, right  Z96.651           Subjective: Shanita Villegas reports she was ill which is why she missed last session. States she is leaving again for another week vacation.        Objective: See treatment diary below    Assessment:   still has some discomfort with step up activities, although no pain reported with leg press.  Demonstrates weakness in right compared to left with leg press.             Plan:  progress strengthening focus on hip and quads                Eval/ Re-eval Auth #/ Referral # Total visits Start date  Expiration date Total active units  Total manual units  PT only or  PT+OT?   2024 No auth, no visit limit         25 Reports no change in insurance over new year.               Past Medical History:   Diagnosis Date    Allergic rhinitis     GERD (gastroesophageal reflux disease)     Hypertension     Urinary tract infection                      Reeval: 25  Insurance :           Visits: 6 7 8 9 10 11 12   Manual: 1/16/25 1/23/25 1/27/25 1/30/25 2/13/25 3/25/25 4/1/25   STM/MI:prn          Joint mobs: hip w/belt  Reeval performed    Reeval performed: focus on hip, glute and quad/hamstring strength TC-                       Ther ex/NMR:          Manual stretching: hamstring, hip flexor, piriformis/glute Hamstring stretch 10 sec x 5   --        Rec bike/  nustep Rec bike 7 min : lv 2 Elliptical machine: 5 min lv 1 Elliptical lv 1 5 min  Elliptical lv 1 5 min  NS; lv 4 5 min     NS; lv 4 5 min    Clamshells  5 sec x15  Red band: 15 x     With ball between feet: 10 x 2 vanna     Sidelying hip  abduction  Sidelying 10 x 2 Sidelying: 10 x 2  Up and over cone: 10 X 2       SLR rev  Up and over cone: 10 x 2       LAQ  3 lb 10 x 2        Frankie test stretch   10 sec x 5         hamstring stretch See above W/ SOS: 10 sec x 5          IT band stretch   W/ SOS: 10 sec x 5    Hip abd/add 30 lb 10 x 2      sidestepping    Seated leg extension 30 lb 10 x 2 22 lb 10 x2      Monster walks    Hamstring curl machine: 40 lb 10x 2 22 lb 10 x   11 lb 10 x      leg press: vanna Green band around knees to facilitate glutes  85 lb 10 x  95 lb 10 x  2 No band: 85 lb 10 x  95 lb 10 x  2 85 lb 10 x  95 lb 10 x  2  Right only; 45 lb 10x 2  Gym leg press: 85 lb: 10 x 2  95 lb 10 x   Right only: 50 lb : 10 x  Gym leg press: 85 lb: 10 x 2  95 lb 10 x     Gym leg press: 85 lb: 10 x 2  95 lb 10 x   Right only: 55 lb : 10 x    Step ups: fwd Lv 3 10 x 2 vanna   Reverse step downs: lv 3 10 x 2 LV 3:      6 in  10 x 2 , fwd, lateral   Eccentric lowering off step laterally Lv 3 10 x 2  LV 3         Hip flexion seated  3 lb 10 x 2 Banded hip flexion against the wall: red looped band: 10 x 2       Sit/stand with hip hinge + band Green band:  10 x 2   Green:     10 x2 tap to chair   NMR:          TA activation (stab cuff)          + marching Standin x  above       +hip abd iso          +hip add iso          + alternating LE extension          SLS 5 sec x 10 2 finger support   5 sec x 10  no UE support        Glut set prone          Glute set + bridge 5 sec x 20  + red hip abd iso: 5 sec x 10    + hip add iso: 5 sec x 10       + red hip abd iso: 5 sec x 10    + hip add iso: 5 sec x 10              Therapeutic Activity:           Reevaluation                    Gait Training:                     HEP:                     Modalities          MH          ice          Total time:            Access Code: [email or web address]  URL: [email or web address]  Date: 2024  Prepared by: Vera Arriaga    Exercises  - Seated Long Arc Quad with Ankle  Weight  - 1 x daily - 7 x weekly - 2 sets - 10 reps  - Supine Active Straight Leg Raise  - 1 x daily - 7 x weekly - 2 sets - 10 reps  - Clamshell  - 1 x daily - 7 x weekly - 2 sets - 10 reps  - Sidelying Hip Abduction  - 1 x daily - 7 x weekly - 1 sets - 10 reps  - Supine Hamstring Stretch with Strap  - 1 x daily - 7 x weekly - 1 sets - 5 reps - 10 sec  hold       Access Code: [email or web address]  URL: [email or web address]  Date: 01/23/2025  Prepared by: Verarosemarie Arriaga    Exercises  - Supine ITB Stretch with Strap  - 1 x daily - 7 x weekly - 1 sets - 5 reps - 10 sec hold  - Seated March  - 1 x daily - 7 x weekly - 2 sets - 10 reps         Access Code: [email or web address]  URL: [email or web address]  Date: 02/13/2025  Prepared by: Vera Arriaga    Exercises  - Backward Step Down with Counter Support  - 1 x daily - 7 x weekly - 2 sets - 10 reps  - Side Step Down with Counter Support  - 1 x daily - 7 x weekly - 2 sets - 10 reps  - Supine Hamstring Stretch with Strap  - 1 x daily - 7 x weekly - 1 sets - 5 reps - 10 sec hold  - Hooklying Clamshell with Resistance  - 1 x daily - 7 x weekly - 2 sets - 10 reps  - Sidelying Hip Abduction  - 1 x daily - 7 x weekly - 2 sets - 10 reps  - Active Straight Leg Raise with Quad Set  - 1 x daily - 7 x weekly - 2 sets - 10 reps  - Seated Long Arc Quad  - 1 x daily - 7 x weekly - 3 sets - 10 reps  - Sit to Stand  - 1 x daily - 7 x weekly - 2 sets - 10 reps

## 2025-04-10 ENCOUNTER — OFFICE VISIT (OUTPATIENT)
Dept: PHYSICAL THERAPY | Facility: CLINIC | Age: 80
End: 2025-04-10
Payer: MEDICARE

## 2025-04-10 DIAGNOSIS — M70.51 PES ANSERINUS BURSITIS OF RIGHT KNEE: ICD-10-CM

## 2025-04-10 DIAGNOSIS — Z96.651 HISTORY OF TOTAL KNEE ARTHROPLASTY, RIGHT: ICD-10-CM

## 2025-04-10 DIAGNOSIS — M76.31 ILIOTIBIAL BAND SYNDROME AFFECTING LOWER LEG, RIGHT: ICD-10-CM

## 2025-04-10 DIAGNOSIS — M70.61 TROCHANTERIC BURSITIS OF RIGHT HIP: Primary | ICD-10-CM

## 2025-04-10 PROCEDURE — 97112 NEUROMUSCULAR REEDUCATION: CPT | Performed by: PHYSICAL THERAPIST

## 2025-04-10 PROCEDURE — 97110 THERAPEUTIC EXERCISES: CPT | Performed by: PHYSICAL THERAPIST

## 2025-04-10 NOTE — PROGRESS NOTES
Daily Note         Today's date: 4/10/2025  Patient name: Shanita Villegas  : 1945  MRN: [id number]  Referring provider: Servando Raya DO  Dx:   Encounter Diagnosis     ICD-10-CM    1. Trochanteric bursitis of right hip  M70.61       2. Iliotibial band syndrome affecting lower leg, right  M76.31       3. Pes anserinus bursitis of right knee  M70.51       4. History of total knee arthroplasty, right  Z96.651           Subjective: Shanita Villegas reports no knee pain entering today.  Occasionally up and down the stairs.        Objective: See treatment diary below    Assessment:   progress as per treatment diary. Patient fatigued post session especially after leg press and step activities. She did report symptoms with step ups in right knee but was able to compelte the exercise.   . The goal of the current treatment is to address patient's functional limitations as well as objective findings.       Plan:  continue per PLOC          Eval/ Re-eval Auth #/ Referral # Total visits Start date  Expiration date Total active units  Total manual units  PT only or  PT+OT?   2024 No auth, no visit limit         25 Reports no change in insurance over new year.               Past Medical History:   Diagnosis Date    Allergic rhinitis     GERD (gastroesophageal reflux disease)     Hypertension     Urinary tract infection                      Reeval: 25  Insurance :          Visits: 8 9 10 11 12 13   Manual: 1/27/25 1/30/25 2/13/25 3/25/25 4/1/25 4/10/25     STM/MI:prn         Joint mobs: hip w/belt    Reeval performed: focus on hip, glute and quad/hamstring strength TC-                      Ther ex/NMR:         Manual stretching: hamstring, hip flexor, piriformis/glute         Rec bike/  nustep Elliptical lv 1 5 min  Elliptical lv 1 5 min  NS; lv 4 5 min     NS; lv 4 5 min  NS; lv 4 5 min    Clamshells Red band: 15 x     With ball between feet: 10 x 2 vanna   With ball between feet: 10 x 2 vanna               Sidelying hip abduction  Up and over cone: 10 X 2     Sidelying 10 x 2   SLR Up and over cone: 10 x 2     10 x 2   LAQ      2 lb 10 x 2   Frankie test stretch 10 sec x 5          hamstring stretch         IT band stretch   Hip abd/add 30 lb 10 x 2       sidestepping  Seated leg extension 30 lb 10 x 2 22 lb 10 x2       Monster walks  Hamstring curl machine: 40 lb 10x 2 22 lb 10 x   11 lb 10 x       leg press: vanna 85 lb 10 x  95 lb 10 x  2  Right only; 45 lb 10x 2  Gym leg press: 85 lb: 10 x 2  95 lb 10 x   Right only: 50 lb : 10 x  Gym leg press: 85 lb: 10 x 2  95 lb 10 x     Gym leg press: 85 lb: 10 x 2  95 lb 10 x   Right only: 55 lb : 10 x  Gym leg press: 95 lb: 10 x 2  105 lb 10 x   Right only: 55 lb : 10 x    Step ups: fwd     6 in  10 x 2 , fwd, lateral Reverse step downs: 10x 2 6 in  Eccentric lowerin in 10 x 2    Eccentric lowering off step laterally LV 3          Hip flexion seated Banded hip flexion against the wall: red looped band: 10 x 2        Sit/stand with hip hinge + band Green:     10 x2 tap to chair 10 x 2 tap to chair   NMR:         TA activation (stab cuff)         + marching above        +hip abd iso         +hip add iso         + alternating LE extension         SLS 5 sec x 10  no UE support         Glut set prone         Glute set + bridge + red hip abd iso: 5 sec x 10    + hip add iso: 5 sec x 10       + red hip abd iso: 5 sec x 10    + hip add iso: 5 sec x 10  + red hip abd iso: 5 sec x 10    + hip add iso: 5 sec x 10             Therapeutic Activity:          Reevaluation                  Gait Training:                   HEP:                   Modalities         MH         ice         Total time:           Access Code: [email or web address]  URL: [email or web address]  Date: 2024  Prepared by: Vera Arriaga    Exercises  - Seated Long Arc Quad with Ankle Weight  - 1 x daily - 7 x weekly - 2 sets - 10 reps  - Supine Active Straight Leg Raise  - 1 x daily - 7 x weekly - 2 sets  - 10 reps  - Clamshell  - 1 x daily - 7 x weekly - 2 sets - 10 reps  - Sidelying Hip Abduction  - 1 x daily - 7 x weekly - 1 sets - 10 reps  - Supine Hamstring Stretch with Strap  - 1 x daily - 7 x weekly - 1 sets - 5 reps - 10 sec  hold       Access Code: [email or web address]  URL: [email or web address]  Date: 01/23/2025  Prepared by: Vera Arriaga    Exercises  - Supine ITB Stretch with Strap  - 1 x daily - 7 x weekly - 1 sets - 5 reps - 10 sec hold  - Seated March  - 1 x daily - 7 x weekly - 2 sets - 10 reps         Access Code: [email or web address]  URL: [email or web address]  Date: 02/13/2025  Prepared by: Vera Arriaga    Exercises  - Backward Step Down with Counter Support  - 1 x daily - 7 x weekly - 2 sets - 10 reps  - Side Step Down with Counter Support  - 1 x daily - 7 x weekly - 2 sets - 10 reps  - Supine Hamstring Stretch with Strap  - 1 x daily - 7 x weekly - 1 sets - 5 reps - 10 sec hold  - Hooklying Clamshell with Resistance  - 1 x daily - 7 x weekly - 2 sets - 10 reps  - Sidelying Hip Abduction  - 1 x daily - 7 x weekly - 2 sets - 10 reps  - Active Straight Leg Raise with Quad Set  - 1 x daily - 7 x weekly - 2 sets - 10 reps  - Seated Long Arc Quad  - 1 x daily - 7 x weekly - 3 sets - 10 reps  - Sit to Stand  - 1 x daily - 7 x weekly - 2 sets - 10 reps

## 2025-04-15 ENCOUNTER — OFFICE VISIT (OUTPATIENT)
Dept: PHYSICAL THERAPY | Facility: CLINIC | Age: 80
End: 2025-04-15
Payer: MEDICARE

## 2025-04-15 DIAGNOSIS — M70.51 PES ANSERINUS BURSITIS OF RIGHT KNEE: ICD-10-CM

## 2025-04-15 DIAGNOSIS — Z96.651 HISTORY OF TOTAL KNEE ARTHROPLASTY, RIGHT: ICD-10-CM

## 2025-04-15 DIAGNOSIS — M70.61 TROCHANTERIC BURSITIS OF RIGHT HIP: Primary | ICD-10-CM

## 2025-04-15 DIAGNOSIS — M76.31 ILIOTIBIAL BAND SYNDROME AFFECTING LOWER LEG, RIGHT: ICD-10-CM

## 2025-04-15 PROCEDURE — 97110 THERAPEUTIC EXERCISES: CPT | Performed by: PHYSICAL THERAPIST

## 2025-04-15 PROCEDURE — 97112 NEUROMUSCULAR REEDUCATION: CPT | Performed by: PHYSICAL THERAPIST

## 2025-04-15 NOTE — PROGRESS NOTES
Daily Note         Today's date: 4/15/2025  Patient name: Shanita Villegas  : 1945  MRN: [id number]  Referring provider: Servando Raya DO  Dx:   Encounter Diagnosis     ICD-10-CM    1. Trochanteric bursitis of right hip  M70.61       2. Iliotibial band syndrome affecting lower leg, right  M76.31       3. Pes anserinus bursitis of right knee  M70.51       4. History of total knee arthroplasty, right  Z96.651           Subjective: Shanita Villegas reports no pain entering today.        Objective: See treatment diary below    Assessment:   progress program as per treatment diary including inc weights on leg press and focus on eccentric lowering on right with inc weight. Patient witth good toleranc eot advancements and minimal to no complaint of pain .  Highly challenged with single leg activities on the leg press. Added resisted ambulation all directions to improve hip and quad strength  . The goal of the current treatment is to address patient's functional limitations as well as objective findings.       Plan:  continue to build strength          Eval/ Re-eval Auth #/ Referral # Total visits Start date  Expiration date Total active units  Total manual units  PT only or  PT+OT?   2024 No auth, no visit limit         25 Reports no change in insurance over new year.               Past Medical History:   Diagnosis Date    Allergic rhinitis     GERD (gastroesophageal reflux disease)     Hypertension     Urinary tract infection                      Reeval: 25  Insurance :         Visits: 10 11 12 13 14   Manual: 2/13/25 3/25/25 4/1/25 4/10/25   4/15/25   STM/MI:prn        Joint mobs: hip w/belt  Reeval performed: focus on hip, glute and quad/hamstring strength TC-                     Ther ex/NMR:        Manual stretching: hamstring, hip flexor, piriformis/glute        Rec bike/  nustep NS; lv 4 5 min     NS; lv 4 5 min  NS; lv 4 5 min  NS; lv 4 5 min    Clamshells   With ball between feet: 10  x 2 vanna   With ball between feet: 10 x 2 vanna              Sidelying hip abduction     Sidelying 10 x 2 -   SLR    10 x 2 10 x 2   LAQ    2 lb 10 x 2 3 lb 5 sec 10 x 2    Frankie test stretch        hamstring stretch        IT band stretch         sidestepping 22 lb 10 x2        Monster walks 22 lb 10 x   11 lb 10 x        leg press: vanna Gym leg press: 85 lb: 10 x 2  95 lb 10 x     Gym leg press: 85 lb: 10 x 2  95 lb 10 x   Right only: 55 lb : 10 x  Gym leg press: 95 lb: 10 x 2  105 lb 10 x   Right only: 55 lb : 10 x  Gym leg press: 105 lb: 10 x   115 lb 10 x 2  Right only: 55 lb : 10 x  concentric with vanna, eccentric right only   Step ups: fwd   6 in  10 x 2 , fwd, lateral Reverse step downs: 10x 2 6 in  Eccentric lowerin in 10 x 2  Reverse step downs: 10x 2 6 in  Eccentric lowerin in 10 x 2    Eccentric lowering off step laterally             Fwd amb: 7.5   Bwd  ambulation: 12.5 lb 5 x   Lateral: Next visit              Hip flexion seated        Sit/stand with hip hinge + band   10 x2 tap to chair 10 x 2 tap to chair    NMR:        TA activation (stab cuff)        + marching        +hip abd iso        +hip add iso        + alternating LE extension        SLS        Glut set prone        Glute set + bridge   + red hip abd iso: 5 sec x 10    + hip add iso: 5 sec x 10  + red hip abd iso: 5 sec x 10    + hip add iso: 5 sec x 10             Therapeutic Activity:         Reevaluation                Gait Training:                 HEP:                 Modalities        MH        ice        Total time:          Access Code: [email or web address]  URL: [email or web address]  Date: 2024  Prepared by: Vera Arriaga    Exercises  - Seated Long Arc Quad with Ankle Weight  - 1 x daily - 7 x weekly - 2 sets - 10 reps  - Supine Active Straight Leg Raise  - 1 x daily - 7 x weekly - 2 sets - 10 reps  - Clamshell  - 1 x daily - 7 x weekly - 2 sets - 10 reps  - Sidelying Hip Abduction  - 1 x daily - 7 x weekly - 1 sets -  10 reps  - Supine Hamstring Stretch with Strap  - 1 x daily - 7 x weekly - 1 sets - 5 reps - 10 sec  hold       Access Code: [email or web address]  URL: [email or web address]  Date: 01/23/2025  Prepared by: Vera Arriaga    Exercises  - Supine ITB Stretch with Strap  - 1 x daily - 7 x weekly - 1 sets - 5 reps - 10 sec hold  - Seated March  - 1 x daily - 7 x weekly - 2 sets - 10 reps         Access Code: [email or web address]  URL: [email or web address]  Date: 02/13/2025  Prepared by: Vera Arriaga    Exercises  - Backward Step Down with Counter Support  - 1 x daily - 7 x weekly - 2 sets - 10 reps  - Side Step Down with Counter Support  - 1 x daily - 7 x weekly - 2 sets - 10 reps  - Supine Hamstring Stretch with Strap  - 1 x daily - 7 x weekly - 1 sets - 5 reps - 10 sec hold  - Hooklying Clamshell with Resistance  - 1 x daily - 7 x weekly - 2 sets - 10 reps  - Sidelying Hip Abduction  - 1 x daily - 7 x weekly - 2 sets - 10 reps  - Active Straight Leg Raise with Quad Set  - 1 x daily - 7 x weekly - 2 sets - 10 reps  - Seated Long Arc Quad  - 1 x daily - 7 x weekly - 3 sets - 10 reps  - Sit to Stand  - 1 x daily - 7 x weekly - 2 sets - 10 reps

## 2025-04-16 ENCOUNTER — TELEPHONE (OUTPATIENT)
Dept: RADIOLOGY | Facility: HOSPITAL | Age: 80
End: 2025-04-16

## 2025-04-17 ENCOUNTER — OFFICE VISIT (OUTPATIENT)
Dept: PHYSICAL THERAPY | Facility: CLINIC | Age: 80
End: 2025-04-17
Payer: MEDICARE

## 2025-04-17 DIAGNOSIS — Z96.651 HISTORY OF TOTAL KNEE ARTHROPLASTY, RIGHT: ICD-10-CM

## 2025-04-17 DIAGNOSIS — M70.51 PES ANSERINUS BURSITIS OF RIGHT KNEE: ICD-10-CM

## 2025-04-17 DIAGNOSIS — M70.61 TROCHANTERIC BURSITIS OF RIGHT HIP: Primary | ICD-10-CM

## 2025-04-17 DIAGNOSIS — M76.31 ILIOTIBIAL BAND SYNDROME AFFECTING LOWER LEG, RIGHT: ICD-10-CM

## 2025-04-17 PROCEDURE — 97110 THERAPEUTIC EXERCISES: CPT | Performed by: PHYSICAL THERAPIST

## 2025-04-17 PROCEDURE — 97112 NEUROMUSCULAR REEDUCATION: CPT | Performed by: PHYSICAL THERAPIST

## 2025-04-17 NOTE — PROGRESS NOTES
"        Daily Note         Today's date: 2025  Patient name: Shanita Villegas  : 1945  MRN: [id number]  Referring provider: Servando Raya DO  Dx:   Encounter Diagnosis     ICD-10-CM    1. Trochanteric bursitis of right hip  M70.61       2. Iliotibial band syndrome affecting lower leg, right  M76.31       3. Pes anserinus bursitis of right knee  M70.51       4. History of total knee arthroplasty, right  Z96.651           Subjective: Shanita Villegas reports she feels the strengthening is helping. States        Objective: See treatment diary below    Assessment:   overlal good tolerance to strengthening. Focus is to strengthening hip and knee musculature specificially glute and quad. No report of pain during session other then \"I feel it\" with step ups . The goal of the current treatment is to address patient's functional limitations as well as objective findings.       Plan:  progress as tolerated for LE strengthening focus on quad and glute          Eval/ Re-eval Auth #/ Referral # Total visits Start date  Expiration date Total active units  Total manual units  PT only or  PT+OT?   2024 No auth, no visit limit         25 Reports no change in insurance over new year.               Past Medical History:   Diagnosis Date    Allergic rhinitis     GERD (gastroesophageal reflux disease)     Hypertension     Urinary tract infection                      Reeval: 25  Insurance :          Visits: 10 11 12 13 14 15   Manual: 2/13/25 3/25/25 4/1/25 4/10/25   4/15/25 4/17/25   STM/MI:prn         Joint mobs: hip w/belt  Reeval performed: focus on hip, glute and quad/hamstring strength TC-                        Ther ex/NMR:         Manual stretching: hamstring, hip flexor, piriformis/glute         Rec bike/  nustep NS; lv 4 5 min     NS; lv 4 5 min  NS; lv 4 5 min  NS; lv 4 5 min  RB: 5 min lv 4   Clamshells   With ball between feet: 10 x 2 vanna   With ball between feet: 10 x 2 vanna              "   Sidelying hip abduction     Sidelying 10 x 2 - 5 sec x 10 x2    SLR    10 x 2 10 x 2 5 sec x 20    LAQ    2 lb 10 x 2 3 lb 5 sec 10 x 2  3 lb 5 sec x 20    Frankie test stretch         hamstring stretch         IT band stretch          sidestepping 22 lb 10 x2         Monster walks 22 lb 10 x   11 lb 10 x         leg press: vanna Gym leg press: 85 lb: 10 x 2  95 lb 10 x     Gym leg press: 85 lb: 10 x 2  95 lb 10 x   Right only: 55 lb : 10 x  Gym leg press: 95 lb: 10 x 2  105 lb 10 x   Right only: 55 lb : 10 x  Gym leg press: 105 lb: 10 x   115 lb 10 x 2  Right only: 55 lb : 10 x  concentric with vanna, eccentric right only Gym leg press: 105 lb: 10 x   115 lb 10 x 2  Right only: 65 lb : 10 x  concentric with vanna, eccentric right only   Step ups: fwd   6 in  10 x 2 , fwd, lateral Reverse step downs: 10x 2 6 in  Eccentric lowerin in 10 x 2  Reverse step downs: 10x 2 6 in  Eccentric lowerin in 10 x 2     Eccentric lowering off step laterally              Fwd amb: 7.5   Bwd  ambulation: 12.5 lb 5 x   Lateral: Next visit    Fwd amb: 7.5 lb 7 x  Bwd  ambulation: 12.5 lb 7 x   Lateral: 7.5 lb 7 x  Each direction             Hip flexion seated         Sit/stand with hip hinge + band   10 x2 tap to chair 10 x 2 tap to chair     NMR:         TA activation (stab cuff)         + marching         +hip abd iso         +hip add iso         + alternating LE extension         SLS         Glut set prone         Glute set + bridge   + red hip abd iso: 5 sec x 10    + hip add iso: 5 sec x 10  + red hip abd iso: 5 sec x 10    + hip add iso: 5 sec x 10   + green hip abd iso: 5 sec x 10    + hip add iso: 5 sec x 10             Therapeutic Activity:          Reevaluation                  Gait Training:                   HEP:                   Modalities         MH         ice         Total time:           Access Code: [email or web address]  URL: [email or web address]  Date: 2024  Prepared by: Vera Hammonds  -  Seated Long Arc Quad with Ankle Weight  - 1 x daily - 7 x weekly - 2 sets - 10 reps  - Supine Active Straight Leg Raise  - 1 x daily - 7 x weekly - 2 sets - 10 reps  - Clamshell  - 1 x daily - 7 x weekly - 2 sets - 10 reps  - Sidelying Hip Abduction  - 1 x daily - 7 x weekly - 1 sets - 10 reps  - Supine Hamstring Stretch with Strap  - 1 x daily - 7 x weekly - 1 sets - 5 reps - 10 sec  hold       Access Code: [email or web address]  URL: [email or web address]  Date: 01/23/2025  Prepared by: Vera Arriaga    Exercises  - Supine ITB Stretch with Strap  - 1 x daily - 7 x weekly - 1 sets - 5 reps - 10 sec hold  - Seated March  - 1 x daily - 7 x weekly - 2 sets - 10 reps         Access Code: [email or web address]  URL: [email or web address]  Date: 02/13/2025  Prepared by: Vera Arriaga    Exercises  - Backward Step Down with Counter Support  - 1 x daily - 7 x weekly - 2 sets - 10 reps  - Side Step Down with Counter Support  - 1 x daily - 7 x weekly - 2 sets - 10 reps  - Supine Hamstring Stretch with Strap  - 1 x daily - 7 x weekly - 1 sets - 5 reps - 10 sec hold  - Hooklying Clamshell with Resistance  - 1 x daily - 7 x weekly - 2 sets - 10 reps  - Sidelying Hip Abduction  - 1 x daily - 7 x weekly - 2 sets - 10 reps  - Active Straight Leg Raise with Quad Set  - 1 x daily - 7 x weekly - 2 sets - 10 reps  - Seated Long Arc Quad  - 1 x daily - 7 x weekly - 3 sets - 10 reps  - Sit to Stand  - 1 x daily - 7 x weekly - 2 sets - 10 reps

## 2025-04-22 ENCOUNTER — OFFICE VISIT (OUTPATIENT)
Dept: PHYSICAL THERAPY | Facility: CLINIC | Age: 80
End: 2025-04-22
Payer: MEDICARE

## 2025-04-22 DIAGNOSIS — M76.31 ILIOTIBIAL BAND SYNDROME AFFECTING LOWER LEG, RIGHT: ICD-10-CM

## 2025-04-22 DIAGNOSIS — M70.61 TROCHANTERIC BURSITIS OF RIGHT HIP: Primary | ICD-10-CM

## 2025-04-22 DIAGNOSIS — Z96.651 HISTORY OF TOTAL KNEE ARTHROPLASTY, RIGHT: ICD-10-CM

## 2025-04-22 PROCEDURE — 97110 THERAPEUTIC EXERCISES: CPT | Performed by: PHYSICAL THERAPIST

## 2025-04-22 NOTE — PROGRESS NOTES
Daily Note         Today's date: 2025  Patient name: Shanita Villegas  : 1945  MRN: [id number]  Referring provider: Servando Raya DO  Dx:   Encounter Diagnosis     ICD-10-CM    1. Trochanteric bursitis of right hip  M70.61       2. Iliotibial band syndrome affecting lower leg, right  M76.31       3. History of total knee arthroplasty, right  Z96.651           Subjective: Shanita Villegas reports Overall her knee is feeling better.       Objective: See treatment diary below    Assessment:   great tolerance to program overall.  Fatigued post program due to increased strengthening.  . The goal of the current treatment is to address patient's functional limitations as well as objective findings.       Plan:  progressing towards d/c           Eval/ Re-eval Auth #/ Referral # Total visits Start date  Expiration date Total active units  Total manual units  PT only or  PT+OT?   2024 No auth, no visit limit         25 Reports no change in insurance over new year.               Past Medical History:   Diagnosis Date    Allergic rhinitis     GERD (gastroesophageal reflux disease)     Hypertension     Urinary tract infection                      Reeval: 25  Insurance :           Visits: 10 11 12 13 14 15 16   Manual: 2/13/25 3/25/25 4/1/25 4/10/25   4/15/25 4/17/25 4/22/25   STM/MI:prn          Joint mobs: hip w/belt  Reeval performed: focus on hip, glute and quad/hamstring strength TC-                           Ther ex/NMR:          Manual stretching: hamstring, hip flexor, piriformis/glute          Rec bike/  nustep NS; lv 4 5 min     NS; lv 4 5 min  NS; lv 4 5 min  NS; lv 4 5 min  RB: 5 min lv 4 NS: lv 3 5 min   Clamshells   With ball between feet: 10 x 2 vanna   With ball between feet: 10 x 2 vanna                  Sidelying hip abduction     Sidelying 10 x 2 - 5 sec x 10 x2  5 sec x 10 x 2    SLR    10 x 2 10 x 2 5 sec x 20  5 sec x 20     LAQ    2 lb 10 x 2 3 lb 5 sec 10 x 2  3 lb 5 sec x  20     Frankie test stretch          hamstring stretch          IT band stretch           sidestepping 22 lb 10 x2          Monster walks 22 lb 10 x   11 lb 10 x          leg press: vanna Gym leg press: 85 lb: 10 x 2  95 lb 10 x     Gym leg press: 85 lb: 10 x 2  95 lb 10 x   Right only: 55 lb : 10 x  Gym leg press: 95 lb: 10 x 2  105 lb 10 x   Right only: 55 lb : 10 x  Gym leg press: 105 lb: 10 x   115 lb 10 x 2  Right only: 55 lb : 10 x  concentric with vanna, eccentric right only Gym leg press: 105 lb: 10 x   115 lb 10 x 2  Right only: 65 lb : 10 x  concentric with vanna, eccentric right only gym leg press: 105 lb: 10 x   115 lb 10 x   125 lb: 10 x  Right only: 65 lb : 10 x 2 concentric with vanna, eccentric right only   Step ups: fwd   6 in  10 x 2 , fwd, lateral Reverse step downs: 10x 2 6 in  Eccentric lowerin in 10 x 2  Reverse step downs: 10x 2 6 in  Eccentric lowerin in 10 x 2      Eccentric lowering off step laterally               Fwd amb: 7.5   Bwd  ambulation: 12.5 lb 5 x   Lateral: Next visit    Fwd amb: 7.5 lb 7 x  Bwd  ambulation: 12.5 lb 7 x   Lateral: 7.5 lb 7 x  Each direction  Fwd amb: 7.5 lb 7 x  Bwd  ambulation: 12.5 lb 7 x   Lateral: 7.5 lb 7 x  Each direction              Hip flexion seated          Sit/stand with hip hinge + band   10 x2 tap to chair 10 x 2 tap to chair   10 x 2   NMR:          TA activation (stab cuff)          + marching          +hip abd iso          +hip add iso          + alternating LE extension          SLS          Glut set prone          Glute set + bridge   + red hip abd iso: 5 sec x 10    + hip add iso: 5 sec x 10  + red hip abd iso: 5 sec x 10    + hip add iso: 5 sec x 10   + green hip abd iso: 5 sec x 10    + hip add iso: 5 sec x 10               Therapeutic Activity:           Reevaluation                    Gait Training:                     HEP:                     Modalities          MH          ice          Total time:            Access Code: [email or web address]  URL:  https://[employer]/  Date: 12/23/2024  Prepared by: Vera Arriaga    Exercises  - Seated Long Arc Quad with Ankle Weight  - 1 x daily - 7 x weekly - 2 sets - 10 reps  - Supine Active Straight Leg Raise  - 1 x daily - 7 x weekly - 2 sets - 10 reps  - Clamshell  - 1 x daily - 7 x weekly - 2 sets - 10 reps  - Sidelying Hip Abduction  - 1 x daily - 7 x weekly - 1 sets - 10 reps  - Supine Hamstring Stretch with Strap  - 1 x daily - 7 x weekly - 1 sets - 5 reps - 10 sec  hold       Access Code: [email or web address]  URL: [email or web address]  Date: 01/23/2025  Prepared by: Vera Arriaga    Exercises  - Supine ITB Stretch with Strap  - 1 x daily - 7 x weekly - 1 sets - 5 reps - 10 sec hold  - Seated March  - 1 x daily - 7 x weekly - 2 sets - 10 reps         Access Code: [email or web address]  URL: https://[employer]/  Date: 02/13/2025  Prepared by: Vera Arriaga    Exercises  - Backward Step Down with Counter Support  - 1 x daily - 7 x weekly - 2 sets - 10 reps  - Side Step Down with Counter Support  - 1 x daily - 7 x weekly - 2 sets - 10 reps  - Supine Hamstring Stretch with Strap  - 1 x daily - 7 x weekly - 1 sets - 5 reps - 10 sec hold  - Hooklying Clamshell with Resistance  - 1 x daily - 7 x weekly - 2 sets - 10 reps  - Sidelying Hip Abduction  - 1 x daily - 7 x weekly - 2 sets - 10 reps  - Active Straight Leg Raise with Quad Set  - 1 x daily - 7 x weekly - 2 sets - 10 reps  - Seated Long Arc Quad  - 1 x daily - 7 x weekly - 3 sets - 10 reps  - Sit to Stand  - 1 x daily - 7 x weekly - 2 sets - 10 reps

## 2025-04-24 ENCOUNTER — APPOINTMENT (OUTPATIENT)
Dept: PHYSICAL THERAPY | Facility: CLINIC | Age: 80
End: 2025-04-24
Payer: MEDICARE

## 2025-04-28 ENCOUNTER — APPOINTMENT (OUTPATIENT)
Dept: PHYSICAL THERAPY | Facility: CLINIC | Age: 80
End: 2025-04-28
Payer: MEDICARE

## 2025-04-29 ENCOUNTER — APPOINTMENT (OUTPATIENT)
Dept: PHYSICAL THERAPY | Facility: CLINIC | Age: 80
End: 2025-04-29
Payer: MEDICARE

## 2025-04-30 ENCOUNTER — OFFICE VISIT (OUTPATIENT)
Dept: PHYSICAL THERAPY | Facility: CLINIC | Age: 80
End: 2025-04-30
Attending: ORTHOPAEDIC SURGERY
Payer: MEDICARE

## 2025-04-30 DIAGNOSIS — M70.51 PES ANSERINUS BURSITIS OF RIGHT KNEE: ICD-10-CM

## 2025-04-30 DIAGNOSIS — M70.61 TROCHANTERIC BURSITIS OF RIGHT HIP: Primary | ICD-10-CM

## 2025-04-30 DIAGNOSIS — M76.31 ILIOTIBIAL BAND SYNDROME AFFECTING LOWER LEG, RIGHT: ICD-10-CM

## 2025-04-30 DIAGNOSIS — Z96.651 HISTORY OF TOTAL KNEE ARTHROPLASTY, RIGHT: ICD-10-CM

## 2025-04-30 PROCEDURE — 97110 THERAPEUTIC EXERCISES: CPT | Performed by: PHYSICAL THERAPIST

## 2025-04-30 NOTE — PROGRESS NOTES
Daily Note         Today's date: 2025  Patient name: Shanita Villegas  : 1945  MRN: [id number]  Referring provider: Servando Raya DO  Dx:   Encounter Diagnosis     ICD-10-CM    1. Trochanteric bursitis of right hip  M70.61       2. Iliotibial band syndrome affecting lower leg, right  M76.31       3. History of total knee arthroplasty, right  Z96.651       4. Pes anserinus bursitis of right knee  M70.51           Subjective: Shanita Villegas reports she feels her knee is getting stronger.         Objective: See treatment diary below    Assessment:   patient was able to progress as per treatment diary. Patient needed cuing to aovid hip rotation during clamshells.  Fatigued quickly with supine alphabet.   . Denied pain with reverse step downs only fatigue      Plan:  progress strengthening          Eval/ Re-eval Auth #/ Referral # Total visits Start date  Expiration date Total active units  Total manual units  PT only or  PT+OT?   2024 No auth, no visit limit         25 Reports no change in insurance over new year.               Past Medical History:   Diagnosis Date    Allergic rhinitis     GERD (gastroesophageal reflux disease)     Hypertension     Urinary tract infection                      Reeval: 25  Insurance :          Visits: 12 13 14 15 16 17   Manual: 4/1/25 4/10/25   4/15/25 4/17/25 4/22/25 4/30/25   STM/MI:prn         Joint mobs: hip w/belt TC-                          Ther ex/NMR:         Manual stretching: hamstring, hip flexor, piriformis/glute         Rec bike/  nustep NS; lv 4 5 min  NS; lv 4 5 min  NS; lv 4 5 min  RB: 5 min lv 4 NS: lv 3 5 min NS; lv 3 5 min    Clamshells With ball between feet: 10 x 2 vanna   With ball between feet: 10 x 2 vanna                  Sidelying hip abduction   Sidelying 10 x 2 - 5 sec x 10 x2  5 sec x 10 x 2     SLR  10 x 2 10 x 2 5 sec x 20  5 sec x 20   Supine alphabet:  1 x    LAQ  2 lb 10 x 2 3 lb 5 sec 10 x 2  3 lb 5 sec x 20       Frankie test stretch                  hamstring stretch         IT band stretch          sidestepping      TRX squats: with lift under left: 10 x 2   TKE at CC      CC: 7.5 lb 5 secx 15    leg press: olivier Gym leg press: 85 lb: 10 x 2  95 lb 10 x   Right only: 55 lb : 10 x  Gym leg press: 95 lb: 10 x 2  105 lb 10 x   Right only: 55 lb : 10 x  Gym leg press: 105 lb: 10 x   115 lb 10 x 2  Right only: 55 lb : 10 x  concentric with olivier, eccentric right only Gym leg press: 105 lb: 10 x   115 lb 10 x 2  Right only: 65 lb : 10 x  concentric with olivier, eccentric right only gym leg press: 105 lb: 10 x   115 lb 10 x   125 lb: 10 x  Right only: 65 lb : 10 x 2 concentric with olivier, eccentric right only Olivier:   115 lb 10 x   125 lb: 10 x  Right only: 65 lb : 10 x   75 lb 10x concentric with olivier, eccentric right only   Step ups: fwd 6 in  10 x 2 , fwd, lateral Reverse step downs: 10x 2 6 in  Eccentric lowerin in 10 x 2  Reverse step downs: 10x 2 6 in  Eccentric lowerin in 10 x 2    6 in  10 x 2 , fwd, reverse step downs: 6 in: 10 x 2   Eccentric lowering off step laterally            Fwd amb: 7.5   Bwd  ambulation: 12.5 lb 5 x   Lateral: Next visit    Fwd amb: 7.5 lb 7 x  Bwd  ambulation: 12.5 lb 7 x   Lateral: 7.5 lb 7 x  Each direction  Fwd amb: 7.5 lb 7 x  Bwd  ambulation: 12.5 lb 7 x   Lateral: 7.5 lb 7 x  Each direction              Hip flexion seated         Sit/stand with hip hinge + band 10 x2 tap to chair 10 x 2 tap to chair   10 x 2    NMR:         TA activation (stab cuff)         + marching         +hip abd iso         +hip add iso         + alternating LE extension         clamshells      5 sec x 10    Glut set prone         Glute set + bridge + red hip abd iso: 5 sec x 10    + hip add iso: 5 sec x 10  + red hip abd iso: 5 sec x 10    + hip add iso: 5 sec x 10   + green hip abd iso: 5 sec x 10    + hip add iso: 5 sec x 10               Therapeutic Activity:          Reevaluation                  Gait Training:                    HEP:                   Modalities         MH         ice         Total time:           Access Code: [email or web address]  URL: [email or web address]  Date: 12/23/2024  Prepared by: Vera Arriaga    Exercises  - Seated Long Arc Quad with Ankle Weight  - 1 x daily - 7 x weekly - 2 sets - 10 reps  - Supine Active Straight Leg Raise  - 1 x daily - 7 x weekly - 2 sets - 10 reps  - Clamshell  - 1 x daily - 7 x weekly - 2 sets - 10 reps  - Sidelying Hip Abduction  - 1 x daily - 7 x weekly - 1 sets - 10 reps  - Supine Hamstring Stretch with Strap  - 1 x daily - 7 x weekly - 1 sets - 5 reps - 10 sec  hold       Access Code: [email or web address]  URL: [email or web address]  Date: 01/23/2025  Prepared by: Vera Arriaga    Exercises  - Supine ITB Stretch with Strap  - 1 x daily - 7 x weekly - 1 sets - 5 reps - 10 sec hold  - Seated March  - 1 x daily - 7 x weekly - 2 sets - 10 reps         Access Code: [email or web address]  URL: [email or web address]  Date: 02/13/2025  Prepared by: Verarosemarie Arriaga    Exercises  - Backward Step Down with Counter Support  - 1 x daily - 7 x weekly - 2 sets - 10 reps  - Side Step Down with Counter Support  - 1 x daily - 7 x weekly - 2 sets - 10 reps  - Supine Hamstring Stretch with Strap  - 1 x daily - 7 x weekly - 1 sets - 5 reps - 10 sec hold  - Hooklying Clamshell with Resistance  - 1 x daily - 7 x weekly - 2 sets - 10 reps  - Sidelying Hip Abduction  - 1 x daily - 7 x weekly - 2 sets - 10 reps  - Active Straight Leg Raise with Quad Set  - 1 x daily - 7 x weekly - 2 sets - 10 reps  - Seated Long Arc Quad  - 1 x daily - 7 x weekly - 3 sets - 10 reps  - Sit to Stand  - 1 x daily - 7 x weekly - 2 sets - 10 reps

## 2025-05-01 ENCOUNTER — OFFICE VISIT (OUTPATIENT)
Dept: PHYSICAL THERAPY | Facility: CLINIC | Age: 80
End: 2025-05-01
Payer: MEDICARE

## 2025-05-01 DIAGNOSIS — M70.61 TROCHANTERIC BURSITIS OF RIGHT HIP: Primary | ICD-10-CM

## 2025-05-01 DIAGNOSIS — M70.51 PES ANSERINUS BURSITIS OF RIGHT KNEE: ICD-10-CM

## 2025-05-01 DIAGNOSIS — M76.31 ILIOTIBIAL BAND SYNDROME AFFECTING LOWER LEG, RIGHT: ICD-10-CM

## 2025-05-01 DIAGNOSIS — Z96.651 HISTORY OF TOTAL KNEE ARTHROPLASTY, RIGHT: ICD-10-CM

## 2025-05-01 PROCEDURE — 97110 THERAPEUTIC EXERCISES: CPT | Performed by: PHYSICAL THERAPIST

## 2025-05-01 PROCEDURE — 97112 NEUROMUSCULAR REEDUCATION: CPT | Performed by: PHYSICAL THERAPIST

## 2025-05-01 NOTE — PROGRESS NOTES
Daily Note         Today's date: 2025  Patient name: Shanita Villegas  : 1945  MRN: [id number]  Referring provider: Servando Raya DO  Dx:   Encounter Diagnosis     ICD-10-CM    1. Trochanteric bursitis of right hip  M70.61       2. Iliotibial band syndrome affecting lower leg, right  M76.31       3. History of total knee arthroplasty, right  Z96.651       4. Pes anserinus bursitis of right knee  M70.51           Subjective: Shanita Villegas reports feeling fatigued today       Objective: See treatment diary below    Assessment:   noted was right pelvic drop during bridge + march.  Increased cuing needed to encourage glute set prior to bridge. Educated patient on relationship of glute + quad ie: climbing stairs, transition sit/stand.. patient was able to complete program this session with no report of knee pain.  . The goal of the current treatment is to address patient's functional limitations as well as objective findings.       Plan:  reeval next session          Eval/ Re-eval Auth #/ Referral # Total visits Start date  Expiration date Total active units  Total manual units  PT only or  PT+OT?   2024 No auth, no visit limit         25 Reports no change in insurance over new year.               Past Medical History:   Diagnosis Date    Allergic rhinitis     GERD (gastroesophageal reflux disease)     Hypertension     Urinary tract infection                      Reeval: 25  Insurance :           Visits: 12 13 14 15 16 17 18   Manual: 4/1/25 4/10/25   4/15/25 4/17/25 4/22/25 4/30/25 5/1/25   STM/MI:prn          Joint mobs: hip w/belt TC-                             Ther ex/NMR:          Manual stretching: hamstring, hip flexor, piriformis/glute                    Rec bike/  nustep NS; lv 4 5 min  NS; lv 4 5 min  NS; lv 4 5 min  RB: 5 min lv 4 NS: lv 3 5 min NS; lv 3 5 min  --   Clamshells With ball between feet: 10 x 2 vanna   With ball between feet: 10 x 2 vanna       5 sec x 20               Sidelying hip abduction   Sidelying 10 x 2 - 5 sec x 10 x2  5 sec x 10 x 2      SLR  10 x 2 10 x 2 5 sec x 20  5 sec x 20   Supine alphabet:  1 x  Supine alphabet:  1 x    LAQ  2 lb 10 x 2 3 lb 5 sec 10 x 2  3 lb 5 sec x 20       Frankie test stretch                    hamstring stretch          IT band stretch           sidestepping      TRX squats: with lift under left: 10 x 2 TRX squats: with lift under left: 10 x 2   TKE at CC      CC: 7.5 lb 5 secx 15  CC: 7.5 lb 5 secx 15    leg press: olivier Gym leg press: 85 lb: 10 x 2  95 lb 10 x   Right only: 55 lb : 10 x  Gym leg press: 95 lb: 10 x 2  105 lb 10 x   Right only: 55 lb : 10 x  Gym leg press: 105 lb: 10 x   115 lb 10 x 2  Right only: 55 lb : 10 x  concentric with olivier, eccentric right only Gym leg press: 105 lb: 10 x   115 lb 10 x 2  Right only: 65 lb : 10 x  concentric with olivier, eccentric right only gym leg press: 105 lb: 10 x   115 lb 10 x   125 lb: 10 x  Right only: 65 lb : 10 x 2 concentric with olivier, eccentric right only Olivier:   115 lb 10 x   125 lb: 10 x  Right only: 65 lb : 10 x   75 lb 10x concentric with olivier, eccentric right only Olivier:   115 lb 10 x   125 lb: 10 x 2  Right only:   75 lb 10x concentric with olivier, eccentric right only   Step ups: fwd 6 in  10 x 2 , fwd, lateral Reverse step downs: 10x 2 6 in  Eccentric lowerin in 10 x 2  Reverse step downs: 10x 2 6 in  Eccentric lowerin in 10 x 2    6 in  10 x 2 , fwd, reverse step downs: 6 in: 10 x 2    Eccentric lowering off step laterally             Fwd amb: 7.5   Bwd  ambulation: 12.5 lb 5 x   Lateral: Next visit    Fwd amb: 7.5 lb 7 x  Bwd  ambulation: 12.5 lb 7 x   Lateral: 7.5 lb 7 x  Each direction  Fwd amb: 7.5 lb 7 x  Bwd  ambulation: 12.5 lb 7 x   Lateral: 7.5 lb 7 x  Each direction                Hip flexion seated          Sit/stand with hip hinge + band 10 x2 tap to chair 10 x 2 tap to chair   10 x 2     NMR:          TA activation (stab cuff)          + marching          +hip abd  iso          +hip add iso          + alternating LE extension          clamshells      5 sec x 10     Glut set prone          Glute set + bridge + red hip abd iso: 5 sec x 10    + hip add iso: 5 sec x 10  + red hip abd iso: 5 sec x 10    + hip add iso: 5 sec x 10   + green hip abd iso: 5 sec x 10    + hip add iso: 5 sec x 10    Bridging + march: 5 x 4              Therapeutic Activity:           Reevaluation                    Gait Training:                     HEP:                     Modalities                    ice          Total time:            Access Code: [email or web address]  URL: [email or web address]  Date: 12/23/2024  Prepared by: Vera Arriaga    Exercises  - Seated Long Arc Quad with Ankle Weight  - 1 x daily - 7 x weekly - 2 sets - 10 reps  - Supine Active Straight Leg Raise  - 1 x daily - 7 x weekly - 2 sets - 10 reps  - Clamshell  - 1 x daily - 7 x weekly - 2 sets - 10 reps  - Sidelying Hip Abduction  - 1 x daily - 7 x weekly - 1 sets - 10 reps  - Supine Hamstring Stretch with Strap  - 1 x daily - 7 x weekly - 1 sets - 5 reps - 10 sec  hold       Access Code: [email or web address]  URL: [email or web address]  Date: 01/23/2025  Prepared by: Vera Arriaga    Exercises  - Supine ITB Stretch with Strap  - 1 x daily - 7 x weekly - 1 sets - 5 reps - 10 sec hold  - Seated March  - 1 x daily - 7 x weekly - 2 sets - 10 reps         Access Code: [email or web address]  URL: [email or web address]  Date: 02/13/2025  Prepared by: Vera Arriaga    Exercises  - Backward Step Down with Counter Support  - 1 x daily - 7 x weekly - 2 sets - 10 reps  - Side Step Down with Counter Support  - 1 x daily - 7 x weekly - 2 sets - 10 reps  - Supine Hamstring Stretch with Strap  - 1 x daily - 7 x weekly - 1 sets - 5 reps - 10 sec hold  - Hooklying Clamshell with Resistance  - 1 x daily - 7 x weekly - 2 sets - 10 reps  - Sidelying Hip Abduction  - 1 x daily - 7 x weekly - 2 sets - 10 reps  - Active Straight Leg Raise  with Quad Set  - 1 x daily - 7 x weekly - 2 sets - 10 reps  - Seated Long Arc Quad  - 1 x daily - 7 x weekly - 3 sets - 10 reps  - Sit to Stand  - 1 x daily - 7 x weekly - 2 sets - 10 reps

## 2025-05-05 ENCOUNTER — APPOINTMENT (OUTPATIENT)
Dept: PHYSICAL THERAPY | Facility: CLINIC | Age: 80
End: 2025-05-05
Attending: ORTHOPAEDIC SURGERY
Payer: MEDICARE

## 2025-05-07 ENCOUNTER — APPOINTMENT (OUTPATIENT)
Dept: PHYSICAL THERAPY | Facility: CLINIC | Age: 80
End: 2025-05-07
Attending: ORTHOPAEDIC SURGERY
Payer: MEDICARE

## 2025-05-09 ENCOUNTER — EVALUATION (OUTPATIENT)
Dept: PHYSICAL THERAPY | Facility: CLINIC | Age: 80
End: 2025-05-09
Attending: ORTHOPAEDIC SURGERY
Payer: MEDICARE

## 2025-05-09 DIAGNOSIS — M70.61 TROCHANTERIC BURSITIS OF RIGHT HIP: Primary | ICD-10-CM

## 2025-05-09 DIAGNOSIS — M76.31 ILIOTIBIAL BAND SYNDROME AFFECTING LOWER LEG, RIGHT: ICD-10-CM

## 2025-05-09 DIAGNOSIS — M70.51 PES ANSERINUS BURSITIS OF RIGHT KNEE: ICD-10-CM

## 2025-05-09 DIAGNOSIS — Z96.651 HISTORY OF TOTAL KNEE ARTHROPLASTY, RIGHT: ICD-10-CM

## 2025-05-09 PROCEDURE — 97530 THERAPEUTIC ACTIVITIES: CPT | Performed by: PHYSICAL THERAPIST

## 2025-05-09 PROCEDURE — 97110 THERAPEUTIC EXERCISES: CPT | Performed by: PHYSICAL THERAPIST

## 2025-05-09 NOTE — PROGRESS NOTES
"Daily Note/Progress Note      Today's date: 2025  Patient name: Shanita Villegas  : 1945  MRN: [id number]  Referring provider: Servando Raya DO  Dx:   Encounter Diagnosis     ICD-10-CM    1. Trochanteric bursitis of right hip  M70.61       2. Iliotibial band syndrome affecting lower leg, right  M76.31       3. History of total knee arthroplasty, right  Z96.651       4. Pes anserinus bursitis of right knee  M70.51           Subjective: Pt reports % improvement since SOC: 95%.  The last 5% is due to occasional pain on right knee (can occur at night) still mild instability noted upon weightbearing in right knee occasionally.  Pain level at rest:  0 /10, pain level with ADLS:  3-4 /10 \"rare\".  At this time, the functional limitations include: difficulty getting on and off floor,   feels instability with ambulation on occasion.    Objective: See treatment diary below      New goals ( 3/25/25) (25)  STG:  + patient will report 35 % improvement in right knee pain (3 weeks) met   + independent in basic HEP (2-3 weeks) met     LTG  +patient will reportc climbing stairs wihtout increase in pain (4-6 weeks) met   + patient will report 75 % improvement in right knee pain (4-6 weeks) met   + Patient will demonstrate increase  MMT of  hip/knee to  4+/5 for maximum function. (4-6 weeks) still progressing  + Patient will report no functional limitations (4-6 weeks) not met  New goal:  + patient will report increased ease ambulating due to improved stability right knee. (3-4 weeks)  +      Objective findings (24)    Posture   Standing: slight right knee flexion , forward head , mild genu valgum on right ( stillmild right knee flexion present)    LLD:iliac crest =    Skin creases:neg    Shift:neg    Scoliosis: neg        Gait: reduced stance time on right, inc pronation (reduced deviations) )(inc knee flexion right greater then left, no pain when ambulating on level surfaces)( reduced gait deviations noted and " "no pain)   Assistive device: neg   Trunk movement: wnl   Stride length: wnl   Trendelenburg: neg   Foot drop: neg    Functional movements:   Squat: + genu valgum on right , but able to tap and stand without UE assistance (+)pain knee (reduced genu valgum on right, fatigues quickly with repeated sit/stand) (fair form, \"a little pain\" in knee) ( wnl no pain)   SLS: NT  (Right: 5 sec, no pain, left: 10:58 sec) (Right: 11:71 sec, left 14:67)  Bridging: able to lift 75% (-) pain (wnl) ( able to lift 80% of motion)   Transfers sit/stand: independent   bed mobility independent  (Descending staris recipriclaly: + hip IR with descending using left.  No change when performed with blue Tband to facilitate hip Er's)    Lumbar AROM: (status quo)    Flexion: wnl LOM (-) pain   Extension: moderate LOM (-) pain   Side bend: Right: wnl LOM (-) pain     Left: wnl LOM (-) pain         Repeated motions: BL no pain   Standing flexion:    Effect: NE  Standing extension:   Effect: NE      Palpation: + TTP over right greater trochanter, right lateral knee joint ( + TTP lateral hip on right, no longer lateral knee)( no longer TTP)    LE MMT    1/23/25: right/left 3/25/25 (right) 5/9/25 ( right)   L Hip Flexion: 4-/5  R Hip Flexion: 4+/5 4-/5  4-/5 (right), left: 4/5 4+/5 (right)   L Hip Extension: 4/5 R Hip Extension: 4/5 (status quo)      L Hip Abduction: 4+/5 R Hip Abduction: 3+/5 4/5, 4+/5 Left: 4/5, Right: 4-/5   Right: 4/5   L Hip Adduction: 4+/5 R Hip Adduction: 4+/5 (status quo)  Status quo    L hip ER: 4-/5 R hip ER: 3+/5 (clamshells) 4/5, 4/5      L hip IR 4/5 R hip IR: 4/5 (status quo)      L Knee Extension: 4+/5 R Knee Extension: 4/5 (status quo)  4+/5 no pain  (status quo)    L Knee Flexion: 4+/5 R Knee Flexion: 4/5 (status quo)  4+/5 no pain  (status quo)    L Ankle DF: 4/5 R Ankle DF: 4/5 (status quo)      L Ankle PF: 4/5  R Ankle PF: 4/5 (status quo)      sarah   Right: 4-/5, left 4/5 Right: 4/5       LE ROM    1/23/25 " right:  3/25/25:  5/9/25   L hip flexion: wnl degrees R hip flexion: wnl degrees wnl     L hip extension: wnl degrees R hip extension: wnl degrees wnl     L hip IR: wnl degrees  R hip IR: 20 degrees  Right: 40 deg     L hip ER: wnl degrees  R hip ER: wnl degrees  Wnl      L hip abduction: wnl degrees  R hip abduction: 25 degrees  Right: 35      L knee flex: 149 R knee flexion: 120  Wnl  Right 125,   130 + stretch at end range   L knee ext: wnl R knee ext: -2  Right: 0  Right: 0 deg        Dermatomes: wnl to light touch      Flexibility:  Hip flexors:Right: fair  Left: fair   (status quo)      FOTO score is 51% with a 63% prediction in function. (67)(49/69)              Assessment: Shanita Villegas demonstrates signficnat improvement since last reeval. As noted subjective improvement is 95 %. Main complaint at this time is instability in knee occasionally with weight acceptance on right.  Patient will benefit from completing strengthening program for duration of the month to maximize strength prior to discharge to improve stability. Then transition to HEP .  The goal status of Shanita Villegas is indicated above .      Plan  Plan details: HEP development, stretching as needed per objective findings, strengthening core/lower quadrant, A/AA/PROM lumbar/hip motions, joint mobilizations prn, posture education, STM/MI as needed to reduce muscle tension per objective findings, muscle reeducation per objective findings, dynamic stabilization, PLOC discussed and agreed upon with patient , focus on glute, hip strengthening, focus on hip and knee strengthening    Patient would benefit from: Skilled PT  Planned therapy interventions: Abdominal trunk stabilization, Balance, Gait training, HEP, Joint mobilization, Manual therapy, Neuromuscular re-education, Patient education, Postural training, Strengthening, Stretching, Therapeutic exercises, and Activity modification  Frequency: 1-2 x week  Duration in weeks: 3-4 weeks  Plan of  Care beginning date: 5/9/25  Plan of Care expiration date: 4 weeks - 6/9/25          Eval/ Re-eval Auth #/ Referral # Total visits Start date  Expiration date Total active units  Total manual units  PT only or  PT+OT?   12/19/2024 No auth, no visit limit         1/6/25 Reports no change in insurance over new year.               Past Medical History:   Diagnosis Date    Allergic rhinitis     GERD (gastroesophageal reflux disease)     Hypertension     Urinary tract infection                      Reeval: 1/19/25  Insurance :          Visits: 14 15 16 17 18 19   Manual: 4/15/25 4/17/25 4/22/25 4/30/25 5/1/25 5/9/25   STM/MI:prn         Joint mobs: hip w/belt               Reeval performed            Ther ex/NMR:         Manual stretching: hamstring, hip flexor, piriformis/glute                  Rec bike/  nustep NS; lv 4 5 min  RB: 5 min lv 4 NS: lv 3 5 min NS; lv 3 5 min  -- NS; lv 3 5 min    Clamshells     5 sec x 20  5 sec x 20             Sidelying hip abduction  - 5 sec x 10 x2  5 sec x 10 x 2       SLR 10 x 2 5 sec x 20  5 sec x 20   Supine alphabet:  1 x  Supine alphabet:  1 x  Supine alphabet:  1 x    LAQ 3 lb 5 sec 10 x 2  3 lb 5 sec x 20        Frankie test stretch                  hamstring stretch         IT band stretch          sidestepping    TRX squats: with lift under left: 10 x 2 TRX squats: with lift under left: 10 x 2    TKE at CC    CC: 7.5 lb 5 secx 15  CC: 7.5 lb 5 secx 15     leg press: olivier Gym leg press: 105 lb: 10 x   115 lb 10 x 2  Right only: 55 lb : 10 x  concentric with olivier, eccentric right only Gym leg press: 105 lb: 10 x   115 lb 10 x 2  Right only: 65 lb : 10 x  concentric with olivier, eccentric right only gym leg press: 105 lb: 10 x   115 lb 10 x   125 lb: 10 x  Right only: 65 lb : 10 x 2 concentric with olivier, eccentric right only Olivier:   115 lb 10 x   125 lb: 10 x  Right only: 65 lb : 10 x   75 lb 10x concentric with olivier, eccentric right only Olivier:   115 lb 10 x   125 lb: 10 x 2  Right only:    75 lb 10x concentric with vanna, eccentric right only    Step ups: fwd Reverse step downs: 10x 2 6 in  Eccentric lowerin in 10 x 2    6 in  10 x 2 , fwd, reverse step downs: 6 in: 10 x 2  6 in  10 x 2 , fwd, reverse step downs: 6 in: 10 x 2   Eccentric lowering off step laterally          Fwd amb: 7.5   Bwd  ambulation: 12.5 lb 5 x   Lateral: Next visit    Fwd amb: 7.5 lb 7 x  Bwd  ambulation: 12.5 lb 7 x   Lateral: 7.5 lb 7 x  Each direction  Fwd amb: 7.5 lb 7 x  Bwd  ambulation: 12.5 lb 7 x   Lateral: 7.5 lb 7 x  Each direction                Hip flexion seated         Sit/stand with hip hinge + band   10 x 2   No band: 5 lb 10 x2 to standard chair.    NMR:         TA activation (stab cuff)         + marching         +hip abd iso         +hip add iso         + alternating LE extension         clamshells    5 sec x 10      Glut set prone         Glute set + bridge  + green hip abd iso: 5 sec x 10    + hip add iso: 5 sec x 10    Bridging + march: 5 x 4              Therapeutic Activity:          Reevaluation                  Gait Training:                   HEP:                   Modalities         MH         ice         Total time:           Access Code: [email or web address]  URL: [email or web address]  Date: 2024  Prepared by: Vera Arriaga    Exercises  - Seated Long Arc Quad with Ankle Weight  - 1 x daily - 7 x weekly - 2 sets - 10 reps  - Supine Active Straight Leg Raise  - 1 x daily - 7 x weekly - 2 sets - 10 reps  - Clamshell  - 1 x daily - 7 x weekly - 2 sets - 10 reps  - Sidelying Hip Abduction  - 1 x daily - 7 x weekly - 1 sets - 10 reps  - Supine Hamstring Stretch with Strap  - 1 x daily - 7 x weekly - 1 sets - 5 reps - 10 sec  hold       Access Code: [email or web address]  URL: [email or web address]  Date: 2025  Prepared by: Vera Arriaga    Exercises  - Supine ITB Stretch with Strap  - 1 x daily - 7 x weekly - 1 sets - 5 reps - 10 sec hold  - Seated March  - 1 x daily - 7 x weekly -  2 sets - 10 reps         Access Code: [email or web address]  URL: [email or web address]  Date: 02/13/2025  Prepared by: Vera Arriaga    Exercises  - Backward Step Down with Counter Support  - 1 x daily - 7 x weekly - 2 sets - 10 reps  - Side Step Down with Counter Support  - 1 x daily - 7 x weekly - 2 sets - 10 reps  - Supine Hamstring Stretch with Strap  - 1 x daily - 7 x weekly - 1 sets - 5 reps - 10 sec hold  - Hooklying Clamshell with Resistance  - 1 x daily - 7 x weekly - 2 sets - 10 reps  - Sidelying Hip Abduction  - 1 x daily - 7 x weekly - 2 sets - 10 reps  - Active Straight Leg Raise with Quad Set  - 1 x daily - 7 x weekly - 2 sets - 10 reps  - Seated Long Arc Quad  - 1 x daily - 7 x weekly - 3 sets - 10 reps  - Sit to Stand  - 1 x daily - 7 x weekly - 2 sets - 10 reps

## 2025-05-12 ENCOUNTER — APPOINTMENT (OUTPATIENT)
Dept: PHYSICAL THERAPY | Facility: CLINIC | Age: 80
End: 2025-05-12
Attending: ORTHOPAEDIC SURGERY
Payer: MEDICARE

## 2025-05-13 ENCOUNTER — OFFICE VISIT (OUTPATIENT)
Dept: PHYSICAL THERAPY | Facility: CLINIC | Age: 80
End: 2025-05-13
Attending: ORTHOPAEDIC SURGERY
Payer: MEDICARE

## 2025-05-13 DIAGNOSIS — M70.61 TROCHANTERIC BURSITIS OF RIGHT HIP: Primary | ICD-10-CM

## 2025-05-13 DIAGNOSIS — M70.51 PES ANSERINUS BURSITIS OF RIGHT KNEE: ICD-10-CM

## 2025-05-13 DIAGNOSIS — M76.31 ILIOTIBIAL BAND SYNDROME AFFECTING LOWER LEG, RIGHT: ICD-10-CM

## 2025-05-13 PROCEDURE — 97110 THERAPEUTIC EXERCISES: CPT | Performed by: PHYSICAL THERAPIST

## 2025-05-13 NOTE — PROGRESS NOTES
Daily Note         Today's date: 2025  Patient name: Shanita Villegas  : 1945  MRN: [id number]  Referring provider: Servando Raya DO  Dx:   Encounter Diagnosis     ICD-10-CM    1. Trochanteric bursitis of right hip  M70.61       2. Iliotibial band syndrome affecting lower leg, right  M76.31       3. Pes anserinus bursitis of right knee  M70.51           Subjective: Shanita Villegas reports no new complaints.         Objective: See treatment diary below    Assessment:   progressed program as per treatment dairy to improve hip and quad strength. Patient still needs cuing to avoid pelvic rotation with clamshells.  Demonstrated good carryover with increased reps.     Patient was highly challenged with SLR + hip abduction ; needed increased cuing for TA to avoid pelvis rotation when performing the abduction aspect of exercise.  Patient highly challenged with leg press routine.      Plan:  continue strengthening hip and quad          Eval/ Re-eval Auth #/ Referral # Total visits Start date  Expiration date Total active units  Total manual units  PT only or  PT+OT?   2024 No auth, no visit limit         25 Reports no change in insurance over new year.               Past Medical History:   Diagnosis Date    Allergic rhinitis     GERD (gastroesophageal reflux disease)     Hypertension     Urinary tract infection                        Insurance :         Visits:    Manual: 25   STM/MI:prn        Joint mobs: hip w/belt            Reeval performed            Ther ex/NMR:        Manual stretching: hamstring, hip flexor, piriformis/glute                Rec bike/  nustep NS: lv 3 5 min NS; lv 3 5 min  -- NS; lv 3 5 min  RB: 7 min lv 3   Clamshells   5 sec x 20  5 sec x 20  5 sec x20  yellow band           Sidelying hip abduction  5 sec x 10 x 2     + hip ext: 10 x 2   SLR 5 sec x 20   Supine alphabet:  1 x  Supine alphabet:  1 x  Supine alphabet:  1  x  + SLR + hip abd:  10 x 2   LAQ        Frankie test stretch                hamstring stretch        IT band stretch         sidestepping  TRX squats: with lift under left: 10 x 2 TRX squats: with lift under left: 10 x 2     TKE at CC  CC: 7.5 lb 5 secx 15  CC: 7.5 lb 5 secx 15      leg press: olivier gym leg press: 105 lb: 10 x   115 lb 10 x   125 lb: 10 x  Right only: 65 lb : 10 x 2 concentric with olivier, eccentric right only Olivier:   115 lb 10 x   125 lb: 10 x  Right only: 65 lb : 10 x   75 lb 10x concentric with olivier, eccentric right only Olivier:   115 lb 10 x   125 lb: 10 x 2  Right only:   75 lb 10x concentric with olivier, eccentric right only  Olivier:   115 lb 10 x   125 lb: 10 x 2  Right only:   75 lb 10x 2 concentric with olivier, eccentric right only   Step ups: fwd  6 in  10 x 2 , fwd, reverse step downs: 6 in: 10 x 2  6 in  10 x 2 , fwd, reverse step downs: 6 in: 10 x 2    Eccentric lowering off step laterally         Fwd amb: 7.5 lb 7 x  Bwd  ambulation: 12.5 lb 7 x   Lateral: 7.5 lb 7 x  Each direction                Hip flexion seated        Sit/stand with hip hinge + band 10 x 2   No band: 5 lb 10 x2 to standard chair.  5 lb 10 x 2    NMR:        TA activation (stab cuff)        + marching        +hip abd iso        +hip add iso        + alternating LE extension        clamshells  5 sec x 10       Glut set prone        Glute set + bridge   Bridging + march: 5 x 4              Therapeutic Activity:         Reevaluation                Gait Training:                 HEP:                 Modalities        MH        ice        Total time:          Access Code: [email or web address]  URL: [email or web address]  Date: 12/23/2024  Prepared by: Vera Arriaga    Exercises  - Seated Long Arc Quad with Ankle Weight  - 1 x daily - 7 x weekly - 2 sets - 10 reps  - Supine Active Straight Leg Raise  - 1 x daily - 7 x weekly - 2 sets - 10 reps  - Clamshell  - 1 x daily - 7 x weekly - 2 sets - 10 reps  - Sidelying Hip Abduction  - 1 x  daily - 7 x weekly - 1 sets - 10 reps  - Supine Hamstring Stretch with Strap  - 1 x daily - 7 x weekly - 1 sets - 5 reps - 10 sec  hold       Access Code: [email or web address]  URL: [email or web address]  Date: 01/23/2025  Prepared by: Vera Arriaga    Exercises  - Supine ITB Stretch with Strap  - 1 x daily - 7 x weekly - 1 sets - 5 reps - 10 sec hold  - Seated March  - 1 x daily - 7 x weekly - 2 sets - 10 reps         Access Code: [email or web address]  URL: [email or web address]  Date: 02/13/2025  Prepared by: Vera Arriaga    Exercises  - Backward Step Down with Counter Support  - 1 x daily - 7 x weekly - 2 sets - 10 reps  - Side Step Down with Counter Support  - 1 x daily - 7 x weekly - 2 sets - 10 reps  - Supine Hamstring Stretch with Strap  - 1 x daily - 7 x weekly - 1 sets - 5 reps - 10 sec hold  - Hooklying Clamshell with Resistance  - 1 x daily - 7 x weekly - 2 sets - 10 reps  - Sidelying Hip Abduction  - 1 x daily - 7 x weekly - 2 sets - 10 reps  - Active Straight Leg Raise with Quad Set  - 1 x daily - 7 x weekly - 2 sets - 10 reps  - Seated Long Arc Quad  - 1 x daily - 7 x weekly - 3 sets - 10 reps  - Sit to Stand  - 1 x daily - 7 x weekly - 2 sets - 10 reps

## 2025-05-14 ENCOUNTER — OFFICE VISIT (OUTPATIENT)
Dept: PHYSICAL THERAPY | Facility: CLINIC | Age: 80
End: 2025-05-14
Attending: ORTHOPAEDIC SURGERY
Payer: MEDICARE

## 2025-05-14 DIAGNOSIS — M70.51 PES ANSERINUS BURSITIS OF RIGHT KNEE: ICD-10-CM

## 2025-05-14 DIAGNOSIS — M76.31 ILIOTIBIAL BAND SYNDROME AFFECTING LOWER LEG, RIGHT: ICD-10-CM

## 2025-05-14 DIAGNOSIS — Z96.651 HISTORY OF TOTAL KNEE ARTHROPLASTY, RIGHT: ICD-10-CM

## 2025-05-14 DIAGNOSIS — M70.61 TROCHANTERIC BURSITIS OF RIGHT HIP: Primary | ICD-10-CM

## 2025-05-14 PROCEDURE — 97110 THERAPEUTIC EXERCISES: CPT | Performed by: PHYSICAL THERAPIST

## 2025-05-14 NOTE — PROGRESS NOTES
Daily Note         Today's date: 2025  Patient name: Shanita Villegas  : 1945  MRN: [id number]  Referring provider: Servando Raya DO  Dx:   Encounter Diagnosis     ICD-10-CM    1. Trochanteric bursitis of right hip  M70.61       2. Iliotibial band syndrome affecting lower leg, right  M76.31       3. Pes anserinus bursitis of right knee  M70.51       4. History of total knee arthroplasty, right  Z96.651           Subjective: Shanita Villegas reports no new complaints       Objective: See treatment diary below    Assessment:  progressed program as per treatment diary with good tolerance. Patient fatigued quickly with higher leel activities. Needed cuing for proper form with TRX activities. The goal of the current treatment is to address patient's functional limitations as well as objective findings.       Plan: progress strengthening          Eval/ Re-eval Auth #/ Referral # Total visits Start date  Expiration date Total active units  Total manual units  PT only or  PT+OT?   2024 No auth, no visit limit         25 Reports no change in insurance over new year.               Past Medical History:   Diagnosis Date    Allergic rhinitis     GERD (gastroesophageal reflux disease)     Hypertension     Urinary tract infection                        Insurance :          Visits:    Manual: 25   STM/MI:prn         Joint mobs: hip w/belt             Reeval performed              Ther ex/NMR:         Manual stretching: hamstring, hip flexor, piriformis/glute                  Rec bike/  nustep NS: lv 3 5 min NS; lv 3 5 min  -- NS; lv 3 5 min  RB: 7 min lv 3 NS; lv 3 5 min   Clamshells   5 sec x 20  5 sec x 20  5 sec x20  yellow band Yellow: 10 x each side             Sidelying hip abduction  5 sec x 10 x 2     + hip ext: 10 x 2    SLR 5 sec x 20   Supine alphabet:  1 x  Supine alphabet:  1 x  Supine alphabet:  1 x  + SLR + hip abd:  10 x 2     LAQ         Frankie test stretch                  hamstring stretch         IT band stretch          sidestepping  TRX squats: with lift under left: 10 x 2 TRX squats: with lift under left: 10 x 2   Trx reverse lunges: 10 x each side  +W/ lift under left: 10x 2  + Lateral squat: 10 x   TKE at CC  CC: 7.5 lb 5 secx 15  CC: 7.5 lb 5 secx 15       leg press: olivier gym leg press: 105 lb: 10 x   115 lb 10 x   125 lb: 10 x  Right only: 65 lb : 10 x 2 concentric with olivier, eccentric right only Olivier:   115 lb 10 x   125 lb: 10 x  Right only: 65 lb : 10 x   75 lb 10x concentric with olivier, eccentric right only Olivier:   115 lb 10 x   125 lb: 10 x 2  Right only:   75 lb 10x concentric with olivier, eccentric right only  Olivier:   115 lb 10 x   125 lb: 10 x 2  Right only:   75 lb 10x 2 concentric with olivier, eccentric right only Olivier:   115 lb 10 x   125 lb: 10 x 2  Right only:   75 lb 10x 2 concentric with olivier, eccentric right only   Step ups: fwd  6 in  10 x 2 , fwd, reverse step downs: 6 in: 10 x 2  6 in  10 x 2 , fwd, reverse step downs: 6 in: 10 x 2  8 in  10 x 2 , fwd, reverse step downs: 8 in: 10 x 2   Eccentric lowering off step laterally          Fwd amb: 7.5 lb 7 x  Bwd  ambulation: 12.5 lb 7 x   Lateral: 7.5 lb 7 x  Each direction                  Hip flexion seated         Sit/stand with hip hinge + band 10 x 2   No band: 5 lb 10 x2 to standard chair.  5 lb 10 x 2     NMR:         TA activation (stab cuff)         + marching         +hip abd iso         +hip add iso         + alternating LE extension         clamshells  5 sec x 10        Glut set prone         Glute set + bridge   Bridging + march: 5 x 4                Therapeutic Activity:          Reevaluation                  Gait Training:                   HEP:                   Modalities         MH         ice         Total time:           Access Code: [email or web address]  URL: [email or web address]  Date: 12/23/2024  Prepared by: Vera Arriaga    Exercises  - Seated Long  Arc Quad with Ankle Weight  - 1 x daily - 7 x weekly - 2 sets - 10 reps  - Supine Active Straight Leg Raise  - 1 x daily - 7 x weekly - 2 sets - 10 reps  - Clamshell  - 1 x daily - 7 x weekly - 2 sets - 10 reps  - Sidelying Hip Abduction  - 1 x daily - 7 x weekly - 1 sets - 10 reps  - Supine Hamstring Stretch with Strap  - 1 x daily - 7 x weekly - 1 sets - 5 reps - 10 sec  hold       Access Code: [email or web address]  URL: [email or web address]  Date: 01/23/2025  Prepared by: Vera Arriaga    Exercises  - Supine ITB Stretch with Strap  - 1 x daily - 7 x weekly - 1 sets - 5 reps - 10 sec hold  - Seated March  - 1 x daily - 7 x weekly - 2 sets - 10 reps         Access Code: [email or web address]  URL: [email or web address]/  Date: 02/13/2025  Prepared by: Vera Arriaga    Exercises  - Backward Step Down with Counter Support  - 1 x daily - 7 x weekly - 2 sets - 10 reps  - Side Step Down with Counter Support  - 1 x daily - 7 x weekly - 2 sets - 10 reps  - Supine Hamstring Stretch with Strap  - 1 x daily - 7 x weekly - 1 sets - 5 reps - 10 sec hold  - Hooklying Clamshell with Resistance  - 1 x daily - 7 x weekly - 2 sets - 10 reps  - Sidelying Hip Abduction  - 1 x daily - 7 x weekly - 2 sets - 10 reps  - Active Straight Leg Raise with Quad Set  - 1 x daily - 7 x weekly - 2 sets - 10 reps  - Seated Long Arc Quad  - 1 x daily - 7 x weekly - 3 sets - 10 reps  - Sit to Stand  - 1 x daily - 7 x weekly - 2 sets - 10 reps

## 2025-05-21 ENCOUNTER — OFFICE VISIT (OUTPATIENT)
Dept: PHYSICAL THERAPY | Facility: CLINIC | Age: 80
End: 2025-05-21
Attending: ORTHOPAEDIC SURGERY
Payer: MEDICARE

## 2025-05-21 DIAGNOSIS — M70.51 PES ANSERINUS BURSITIS OF RIGHT KNEE: ICD-10-CM

## 2025-05-21 DIAGNOSIS — M70.61 TROCHANTERIC BURSITIS OF RIGHT HIP: Primary | ICD-10-CM

## 2025-05-21 DIAGNOSIS — M76.31 ILIOTIBIAL BAND SYNDROME AFFECTING LOWER LEG, RIGHT: ICD-10-CM

## 2025-05-21 DIAGNOSIS — Z96.651 HISTORY OF TOTAL KNEE ARTHROPLASTY, RIGHT: ICD-10-CM

## 2025-05-21 PROCEDURE — 97110 THERAPEUTIC EXERCISES: CPT | Performed by: PHYSICAL THERAPIST

## 2025-05-21 NOTE — PROGRESS NOTES
Daily Note         Today's date: 2025  Patient name: Shanita Villegas  : 1945  MRN: [id number]  Referring provider: Servando Raya DO  Dx:   Encounter Diagnosis     ICD-10-CM    1. Trochanteric bursitis of right hip  M70.61       2. Iliotibial band syndrome affecting lower leg, right  M76.31       3. Pes anserinus bursitis of right knee  M70.51       4. History of total knee arthroplasty, right  Z96.651           Subjective: Shanita Villegas reports no issues after last session       Objective: See treatment diary below    Assessment:  good tolerance to higher level program.  Patient still needs minor cuing to avoid hip IR /genu valgum. Patient needs less direction for proper form with there ex.   . The goal of the current treatment is to address patient's functional limitations as well as objective findings.       Plan: progress as tolerated          Eval/ Re-eval Auth #/ Referral # Total visits Start date  Expiration date Total active units  Total manual units  PT only or  PT+OT?   2024 No auth, no visit limit         25 Reports no change in insurance over new year.               Past Medical History:   Diagnosis Date    Allergic rhinitis     GERD (gastroesophageal reflux disease)     Hypertension     Urinary tract infection                        Insurance :          Visits:    Manual: 25   STM/MI:prn         Joint mobs: hip w/belt            Reeval performed               Ther ex/NMR:         Manual stretching: hamstring, hip flexor, piriformis/glute                  Rec bike/  nustep NS; lv 3 5 min  -- NS; lv 3 5 min  RB: 7 min lv 3 NS; lv 3 5 min NS; lv 3 5 min   Clamshells  5 sec x 20  5 sec x 20  5 sec x20  yellow band Yellow: 10 x each side              Sidelying hip abduction     + hip ext: 10 x 2  + hip ext: 10 x 2   SLR Supine alphabet:  1 x  Supine alphabet:  1 x  Supine alphabet:  1 x  + SLR + hip abd:  10 x 2  +  SLR + hip abd:  10 x 2   LAQ         Frankie test stretch                  hamstring stretch         IT band stretch          sidestepping TRX squats: with lift under left: 10 x 2 TRX squats: with lift under left: 10 x 2   Trx reverse lunges: 10 x each side  +W/ lift under left: 10x 2  + Lateral squat: 10 x Trx reverse lunges: 10 x each side  +W/ lift under left: 10x 2  + Lateral squat: 10 x   TKE at CC CC: 7.5 lb 5 secx 15  CC: 7.5 lb 5 secx 15        leg press: olivier Olivier:   115 lb 10 x   125 lb: 10 x  Right only: 65 lb : 10 x   75 lb 10x concentric with olivier, eccentric right only Olivier:   115 lb 10 x   125 lb: 10 x 2  Right only:   75 lb 10x concentric with olivier, eccentric right only  Olivier:   115 lb 10 x   125 lb: 10 x 2  Right only:   75 lb 10x 2 concentric with olivier, eccentric right only Olivier:   115 lb 10 x   125 lb: 10 x 2  Right only:   75 lb 10x 2 concentric with olivier, eccentric right only Olivier:   115 lb 10 x   125 lb: 10 x 2  Right only:   75 lb 10x 2 concentric with olivier, eccentric right only   Step ups: fwd 6 in  10 x 2 , fwd, reverse step downs: 6 in: 10 x 2  6 in  10 x 2 , fwd, reverse step downs: 6 in: 10 x 2  8 in  10 x 2 , fwd, reverse step downs: 8 in: 10 x 2    Eccentric lowering off step laterally                           Hip flexion seated         Sit/stand with hip hinge + band   No band: 5 lb 10 x2 to standard chair.  5 lb 10 x 2      NMR:         TA activation (stab cuff)         + marching         +hip abd iso         +hip add iso         + alternating LE extension         clamshells 5 sec x 10         Glut set prone         Glute set + bridge  Bridging + march: 5 x 4                 Therapeutic Activity:          Reevaluation                  Gait Training:                   HEP:                   Modalities         MH         ice         Total time:           Access Code: [email or web address]  URL: [email or web address]  Date: 12/23/2024  Prepared by: Vera Arriaga    Exercises  - Seated Long Arc  Quad with Ankle Weight  - 1 x daily - 7 x weekly - 2 sets - 10 reps  - Supine Active Straight Leg Raise  - 1 x daily - 7 x weekly - 2 sets - 10 reps  - Clamshell  - 1 x daily - 7 x weekly - 2 sets - 10 reps  - Sidelying Hip Abduction  - 1 x daily - 7 x weekly - 1 sets - 10 reps  - Supine Hamstring Stretch with Strap  - 1 x daily - 7 x weekly - 1 sets - 5 reps - 10 sec  hold       Access Code: [email or web address]  URL: [email or web address]  Date: 01/23/2025  Prepared by: Vera Arriaga    Exercises  - Supine ITB Stretch with Strap  - 1 x daily - 7 x weekly - 1 sets - 5 reps - 10 sec hold  - Seated March  - 1 x daily - 7 x weekly - 2 sets - 10 reps         Access Code: [email or web address]  URL: [email or web address]  Date: 02/13/2025  Prepared by: Vera Arriaga    Exercises  - Backward Step Down with Counter Support  - 1 x daily - 7 x weekly - 2 sets - 10 reps  - Side Step Down with Counter Support  - 1 x daily - 7 x weekly - 2 sets - 10 reps  - Supine Hamstring Stretch with Strap  - 1 x daily - 7 x weekly - 1 sets - 5 reps - 10 sec hold  - Hooklying Clamshell with Resistance  - 1 x daily - 7 x weekly - 2 sets - 10 reps  - Sidelying Hip Abduction  - 1 x daily - 7 x weekly - 2 sets - 10 reps  - Active Straight Leg Raise with Quad Set  - 1 x daily - 7 x weekly - 2 sets - 10 reps  - Seated Long Arc Quad  - 1 x daily - 7 x weekly - 3 sets - 10 reps  - Sit to Stand  - 1 x daily - 7 x weekly - 2 sets - 10 reps

## 2025-05-29 ENCOUNTER — OFFICE VISIT (OUTPATIENT)
Dept: PHYSICAL THERAPY | Facility: CLINIC | Age: 80
End: 2025-05-29
Attending: ORTHOPAEDIC SURGERY
Payer: MEDICARE

## 2025-05-29 DIAGNOSIS — Z96.651 HISTORY OF TOTAL KNEE ARTHROPLASTY, RIGHT: ICD-10-CM

## 2025-05-29 DIAGNOSIS — M70.51 PES ANSERINUS BURSITIS OF RIGHT KNEE: ICD-10-CM

## 2025-05-29 DIAGNOSIS — M70.61 TROCHANTERIC BURSITIS OF RIGHT HIP: Primary | ICD-10-CM

## 2025-05-29 DIAGNOSIS — M76.31 ILIOTIBIAL BAND SYNDROME AFFECTING LOWER LEG, RIGHT: ICD-10-CM

## 2025-05-29 PROCEDURE — 97110 THERAPEUTIC EXERCISES: CPT | Performed by: PHYSICAL THERAPIST

## 2025-05-29 NOTE — PROGRESS NOTES
Daily Note/Progress Note      Today's date: 2025  Patient name: Shanita Villegas  : 1945  MRN: [id number]  Referring provider: Servando Raya DO  Dx:   Encounter Diagnosis     ICD-10-CM    1. Trochanteric bursitis of right hip  M70.61       2. Iliotibial band syndrome affecting lower leg, right  M76.31       3. Pes anserinus bursitis of right knee  M70.51       4. History of total knee arthroplasty, right  Z96.651           Subjective: Pt reports  Pain level at rest:  0 /10, pain level with ADLS:  0 /10,   At this time, the functional limitations include: only limitation is kneeling.       Objective: See treatment diary below    New goals ( 3/25/25) (25(25)  STG:  + patient will report 35 % improvement in right knee pain (3 weeks) met   + independent in basic HEP (2-3 weeks) met     LTG  +patient will reportc climbing stairs wihtout increase in pain (4-6 weeks) met   + patient will report 75 % improvement in right knee pain (4-6 weeks) met   + Patient will demonstrate increase  MMT of  hip/knee to  4+/5 for maximum function. (4-6 weeks) still progressing  + Patient will report no functional limitations (4-6 weeks) not met  New goal:  + patient will report increased ease ambulating due to improved stability right knee. (3-4 weeks) met  +    Assessment: please refer to most recent reeval for objective information     Shanita Villegas demonstrates signficant gains since SOC incuding improved FOTO score and pain levels and function .  The goal status of Shanita Villegas is indicated above  and patinet no longer requires skilled PT. Upgraded HEP issued in written form.    Plan: d/c to HEP          Eval/ Re-eval Auth #/ Referral # Total visits Start date  Expiration date Total active units  Total manual units  PT only or  PT+OT?   2024 No auth, no visit limit         25 Reports no change in insurance over new year.               Past Medical History:   Diagnosis Date    Allergic rhinitis      GERD (gastroesophageal reflux disease)     Hypertension          Urinary tract infection                        Insurance :        FOTO   Visits: 17 18 19 20 21 22 23   Manual: 4/30/25 5/1/25 5/9/25 5/13/25 5/14/25 5/21/25 5/29/25   STM/MI:prn          Joint mobs: hip w/belt             Reeval performed                 Ther ex/NMR:          Manual stretching: hamstring, hip flexor, piriformis/glute                    Rec bike/  nustep NS; lv 3 5 min  -- NS; lv 3 5 min  RB: 7 min lv 3 NS; lv 3 5 min NS; lv 3 5 min RB: 5 min    Clamshells  5 sec x 20  5 sec x 20  5 sec x20  yellow band Yellow: 10 x each side                Sidelying hip abduction     + hip ext: 10 x 2  + hip ext: 10 x 2    SLR Supine alphabet:  1 x  Supine alphabet:  1 x  Supine alphabet:  1 x  + SLR + hip abd:  10 x 2  + SLR + hip abd:  10 x 2    LAQ          Frankie test stretch                    hamstring stretch          IT band stretch           sidestepping TRX squats: with lift under left: 10 x 2 TRX squats: with lift under left: 10 x 2   Trx reverse lunges: 10 x each side  +W/ lift under left: 10x 2  + Lateral squat: 10 x Trx reverse lunges: 10 x each side  +W/ lift under left: 10x 2  + Lateral squat: 10 x Trx reverse lunges: 10 x each side  +W/ lift under left: 10x 2  + Lateral squat: 10 x   TKE at CC CC: 7.5 lb 5 secx 15  CC: 7.5 lb 5 secx 15         leg press: olivier Olivier:   115 lb 10 x   125 lb: 10 x  Right only: 65 lb : 10 x   75 lb 10x concentric with olivier, eccentric right only Olivier:   115 lb 10 x   125 lb: 10 x 2  Right only:   75 lb 10x concentric with olivier, eccentric right only  Olivier:   115 lb 10 x   125 lb: 10 x 2  Right only:   75 lb 10x 2 concentric with olivier, eccentric right only Olivier:   115 lb 10 x   125 lb: 10 x 2  Right only:   75 lb 10x 2 concentric with olivier, eccentric right only Olivier:   115 lb 10 x   125 lb: 10 x 2  Right only:   75 lb 10x 2 concentric with olivier, eccentric right only Olivier:   115 lb 10 x   125 lb: 10 x 2  Right only:   75  lb 10x 2 concentric with vanna, eccentric right only   Step ups: fwd 6 in  10 x 2 , fwd, reverse step downs: 6 in: 10 x 2  6 in  10 x 2 , fwd, reverse step downs: 6 in: 10 x 2  8 in  10 x 2 , fwd, reverse step downs: 8 in: 10 x 2  8 in  10 x 2 , fwd, reverse step downs: 8 in: 10 x 2   Eccentric lowering off step laterally                              Hip flexion seated          Sit/stand with hip hinge + band   No band: 5 lb 10 x2 to standard chair.  5 lb 10 x 2       NMR:          TA activation (stab cuff)          + marching          +hip abd iso          +hip add iso          + alternating LE extension          clamshells 5 sec x 10          Glut set prone          Glute set + bridge  Bridging + march: 5 x 4                   Therapeutic Activity:           Reevaluation                    Gait Training:                     HEP:                     Modalities          MH          ice          Total time:            Access Code: [email or web address]  URL: [email or web address]  Date: 12/23/2024  Prepared by: Vera Arriaga    Exercises  - Seated Long Arc Quad with Ankle Weight  - 1 x daily - 7 x weekly - 2 sets - 10 reps  - Supine Active Straight Leg Raise  - 1 x daily - 7 x weekly - 2 sets - 10 reps  - Clamshell  - 1 x daily - 7 x weekly - 2 sets - 10 reps  - Sidelying Hip Abduction  - 1 x daily - 7 x weekly - 1 sets - 10 reps  - Supine Hamstring Stretch with Strap  - 1 x daily - 7 x weekly - 1 sets - 5 reps - 10 sec  hold       Access Code: [email or web address]  URL: [email or web address]  Date: 01/23/2025  Prepared by: Vera Arriaga    Exercises  - Supine ITB Stretch with Strap  - 1 x daily - 7 x weekly - 1 sets - 5 reps - 10 sec hold  - Seated March  - 1 x daily - 7 x weekly - 2 sets - 10 reps         Access Code: [email or web address]  URL: [email or web address]  Date: 02/13/2025  Prepared by: Vera Arriaga    Exercises  - Backward Step Down with Counter Support  - 1 x daily - 7 x weekly - 2 sets - 10  reps  - Side Step Down with Counter Support  - 1 x daily - 7 x weekly - 2 sets - 10 reps  - Supine Hamstring Stretch with Strap  - 1 x daily - 7 x weekly - 1 sets - 5 reps - 10 sec hold  - Hooklying Clamshell with Resistance  - 1 x daily - 7 x weekly - 2 sets - 10 reps  - Sidelying Hip Abduction  - 1 x daily - 7 x weekly - 2 sets - 10 reps  - Active Straight Leg Raise with Quad Set  - 1 x daily - 7 x weekly - 2 sets - 10 reps  - Seated Long Arc Quad  - 1 x daily - 7 x weekly - 3 sets - 10 reps  - Sit to Stand  - 1 x daily - 7 x weekly - 2 sets - 10 reps      Access Code: [email or web address]  URL: [email or web address]  Date: 05/29/2025  Prepared by: Vera Arriaga    Exercises  - Supine Active Straight Leg Raise  - 1 x daily - 7 x weekly - 2 sets - 10 reps  - Sidelying Hip Abduction  - 1 x daily - 7 x weekly - 2 sets - 10 reps  - Clamshell with Resistance  - 1 x daily - 7 x weekly - 2 sets - 10 reps  - Supine Bridge  - 1 x daily - 7 x weekly - 2 sets - 10 reps  - Squat with Chair Touch  - 1 x daily - 7 x weekly - 3 sets - 10 reps  - Forward Step Up with Counter Support  - 1 x daily - 7 x weekly - 2 sets - 10 reps  - Backward Step Down  - 1 x daily - 7 x weekly - 2 sets - 10 reps

## 2025-05-29 NOTE — HOME EXERCISE EDUCATION
Program_ID:[id number]   Access Code: [email or web address]  URL: [email or web address]  Date: 05-  Prepared By: Vera Arriaga    Program Notes      Exercises      - Supine Active Straight Leg Raise - 1 x daily - 7 x weekly - 2 sets - 10 reps      - Sidelying Hip Abduction - 1 x daily - 7 x weekly - 2 sets - 10 reps      - Clamshell with Resistance - 1 x daily - 7 x weekly - 2 sets - 10 reps      - Supine Bridge - 1 x daily - 7 x weekly - 2 sets - 10 reps      - Squat with Chair Touch - 1 x daily - 7 x weekly - 3 sets - 10 reps      - Forward Step Up with Counter Support - 1 x daily - 7 x weekly - 2 sets - 10 reps      - Backward Step Down - 1 x daily - 7 x weekly - 2 sets - 10 reps

## 2025-06-04 ENCOUNTER — OFFICE VISIT (OUTPATIENT)
Dept: FAMILY MEDICINE CLINIC | Facility: CLINIC | Age: 80
End: 2025-06-04
Payer: MEDICARE

## 2025-06-04 VITALS
RESPIRATION RATE: 12 BRPM | HEART RATE: 94 BPM | WEIGHT: 149 LBS | OXYGEN SATURATION: 95 % | BODY MASS INDEX: 24.79 KG/M2 | DIASTOLIC BLOOD PRESSURE: 68 MMHG | SYSTOLIC BLOOD PRESSURE: 118 MMHG | TEMPERATURE: 97.3 F

## 2025-06-04 DIAGNOSIS — H33.319 RETINAL TEAR, UNSPECIFIED LATERALITY: ICD-10-CM

## 2025-06-04 DIAGNOSIS — E78.2 MIXED HYPERLIPIDEMIA: ICD-10-CM

## 2025-06-04 DIAGNOSIS — I10 PRIMARY HYPERTENSION: Primary | ICD-10-CM

## 2025-06-04 DIAGNOSIS — Z85.828 HISTORY OF SKIN CANCER: ICD-10-CM

## 2025-06-04 PROCEDURE — G2211 COMPLEX E/M VISIT ADD ON: HCPCS | Performed by: INTERNAL MEDICINE

## 2025-06-04 PROCEDURE — 99213 OFFICE O/P EST LOW 20 MIN: CPT | Performed by: INTERNAL MEDICINE

## 2025-06-04 NOTE — ASSESSMENT & PLAN NOTE
Labwork from March was reviewed and lipids are within desired goal, continue rosuvastatin 5 mg daily.  Orders:  •  CBC; Future  •  Comprehensive metabolic panel; Future  •  Lipid panel; Future

## 2025-06-04 NOTE — PROGRESS NOTES
"Name: Shanita Villegas      : 1945      MRN: [id number]  Encounter Provider: Latonya Sauer MD  Encounter Date: 2025   Encounter department: [facility]  :  Assessment & Plan  Primary hypertension  Well controlled and will continue diltiazem at current dosage.  She will start to check bp readings at home at times of fatigue and will let me know of any bp drops.  Orders:  •  CBC; Future  •  Comprehensive metabolic panel; Future  •  Lipid panel; Future    Mixed hyperlipidemia  Labwork from March was reviewed and lipids are within desired goal, continue rosuvastatin 5 mg daily.  Orders:  •  CBC; Future  •  Comprehensive metabolic panel; Future  •  Lipid panel; Future    History of skin cancer    Orders:  •  Ambulatory Referral to Dermatology; Future    Retinal tear, unspecified laterality    Orders:  •  Ambulatory Referral to Ophthalmology; Future           History of Present Illness   Here for follow up visit.  She continues to have some fatigue in the afternoons after she \"does too much,\" this is new for her.   There are no orthostatic symptoms.       Review of Systems   Constitutional:  Positive for fatigue.   Respiratory: Negative.  Negative for chest tightness, shortness of breath and wheezing.    Cardiovascular: Negative.    Neurological:  Negative for dizziness.       Objective   /68   Pulse 94   Temp (!) 97.3 °F (36.3 °C)   Resp 12   Wt 67.6 kg (149 lb)   SpO2 95%   BMI 24.79 kg/m²      Physical Exam    "

## 2025-06-04 NOTE — ASSESSMENT & PLAN NOTE
Well controlled and will continue diltiazem at current dosage.  She will start to check bp readings at home at times of fatigue and will let me know of any bp drops.  Orders:  •  CBC; Future  •  Comprehensive metabolic panel; Future  •  Lipid panel; Future

## 2025-06-09 DIAGNOSIS — R92.8 ABNORMAL MAMMOGRAM: Primary | ICD-10-CM

## 2025-06-09 DIAGNOSIS — R20.8 BURNING SENSATION: ICD-10-CM

## 2025-06-12 ENCOUNTER — OFFICE VISIT (OUTPATIENT)
Dept: URGENT CARE | Facility: CLINIC | Age: 80
End: 2025-06-12
Payer: MEDICARE

## 2025-06-12 ENCOUNTER — HOSPITAL ENCOUNTER (EMERGENCY)
Facility: HOSPITAL | Age: 80
Discharge: HOME | End: 2025-06-12
Attending: EMERGENCY MEDICINE | Admitting: EMERGENCY MEDICINE
Payer: MEDICARE

## 2025-06-12 ENCOUNTER — APPOINTMENT (EMERGENCY)
Dept: RADIOLOGY | Facility: HOSPITAL | Age: 80
End: 2025-06-12
Payer: MEDICARE

## 2025-06-12 VITALS
DIASTOLIC BLOOD PRESSURE: 84 MMHG | TEMPERATURE: 98.5 F | WEIGHT: 148 LBS | BODY MASS INDEX: 24.63 KG/M2 | SYSTOLIC BLOOD PRESSURE: 156 MMHG | RESPIRATION RATE: 14 BRPM | OXYGEN SATURATION: 97 % | HEART RATE: 66 BPM

## 2025-06-12 VITALS
HEART RATE: 90 BPM | HEIGHT: 65 IN | OXYGEN SATURATION: 98 % | DIASTOLIC BLOOD PRESSURE: 76 MMHG | SYSTOLIC BLOOD PRESSURE: 116 MMHG | RESPIRATION RATE: 18 BRPM | WEIGHT: 148 LBS | BODY MASS INDEX: 24.66 KG/M2 | TEMPERATURE: 97.8 F

## 2025-06-12 DIAGNOSIS — R07.89 CHEST WALL PAIN: Primary | ICD-10-CM

## 2025-06-12 DIAGNOSIS — R07.9 CHEST PAIN, UNSPECIFIED TYPE: Primary | ICD-10-CM

## 2025-06-12 LAB
2HR DELTA HS TROPONIN: 0 NG/L
ALBUMIN SERPL BCG-MCNC: 4 G/DL (ref 3.5–5)
ALP SERPL-CCNC: 97 U/L (ref 34–104)
ALT SERPL W P-5'-P-CCNC: 14 U/L (ref 7–52)
ANION GAP SERPL CALCULATED.3IONS-SCNC: 7 MMOL/L (ref 4–13)
AST SERPL W P-5'-P-CCNC: 16 U/L (ref 13–39)
BASOPHILS # BLD AUTO: 0.05 THOUSANDS/ÂΜL (ref 0–0.1)
BASOPHILS NFR BLD AUTO: 0 % (ref 0–1)
BILIRUB SERPL-MCNC: 0.52 MG/DL (ref 0.2–1)
BNP SERPL-MCNC: 28 PG/ML (ref 0–100)
BUN SERPL-MCNC: 18 MG/DL (ref 5–25)
CALCIUM SERPL-MCNC: 9.5 MG/DL (ref 8.4–10.2)
CARDIAC TROPONIN I PNL SERPL HS: 5 NG/L (ref ?–50)
CARDIAC TROPONIN I PNL SERPL HS: 5 NG/L (ref ?–50)
CHLORIDE SERPL-SCNC: 104 MMOL/L (ref 96–108)
CO2 SERPL-SCNC: 26 MMOL/L (ref 21–32)
CREAT SERPL-MCNC: 0.62 MG/DL (ref 0.6–1.3)
D DIMER PPP FEU-MCNC: <0.27 UG/ML FEU
EOSINOPHIL # BLD AUTO: 0.17 THOUSAND/ÂΜL (ref 0–0.61)
EOSINOPHIL NFR BLD AUTO: 1 % (ref 0–6)
ERYTHROCYTE [DISTWIDTH] IN BLOOD BY AUTOMATED COUNT: 12.4 % (ref 11.6–15.1)
FLUAV AG UPPER RESP QL IA.RAPID: NEGATIVE
FLUBV AG UPPER RESP QL IA.RAPID: NEGATIVE
GFR SERPL CREATININE-BSD FRML MDRD: 86 ML/MIN/1.73SQ M
GLUCOSE SERPL-MCNC: 97 MG/DL (ref 65–140)
HCT VFR BLD AUTO: 40.2 % (ref 34.8–46.1)
HGB BLD-MCNC: 13.2 G/DL (ref 11.5–15.4)
IMM GRANULOCYTES # BLD AUTO: 0.02 THOUSAND/UL (ref 0–0.2)
IMM GRANULOCYTES NFR BLD AUTO: 0 % (ref 0–2)
LYMPHOCYTES # BLD AUTO: 8.77 THOUSANDS/ÂΜL (ref 0.6–4.47)
LYMPHOCYTES NFR BLD AUTO: 62 % (ref 14–44)
MCH RBC QN AUTO: 30.3 PG (ref 26.8–34.3)
MCHC RBC AUTO-ENTMCNC: 32.8 G/DL (ref 31.4–37.4)
MCV RBC AUTO: 92 FL (ref 82–98)
MONOCYTES # BLD AUTO: 0.82 THOUSAND/ÂΜL (ref 0.17–1.22)
MONOCYTES NFR BLD AUTO: 6 % (ref 4–12)
NEUTROPHILS # BLD AUTO: 4.36 THOUSANDS/ÂΜL (ref 1.85–7.62)
NEUTS SEG NFR BLD AUTO: 31 % (ref 43–75)
NRBC BLD AUTO-RTO: 0 /100 WBCS
PLATELET # BLD AUTO: 234 THOUSANDS/UL (ref 149–390)
PMV BLD AUTO: 10.9 FL (ref 8.9–12.7)
POTASSIUM SERPL-SCNC: 3.9 MMOL/L (ref 3.5–5.3)
PROT SERPL-MCNC: 6.8 G/DL (ref 6.4–8.4)
RBC # BLD AUTO: 4.36 MILLION/UL (ref 3.81–5.12)
SARS-COV+SARS-COV-2 AG RESP QL IA.RAPID: NEGATIVE
SODIUM SERPL-SCNC: 137 MMOL/L (ref 135–147)
TSH SERPL DL<=0.05 MIU/L-ACNC: 1.49 UIU/ML (ref 0.45–4.5)
WBC # BLD AUTO: 14.19 THOUSAND/UL (ref 4.31–10.16)

## 2025-06-12 PROCEDURE — 93005 ELECTROCARDIOGRAM TRACING: CPT

## 2025-06-12 PROCEDURE — 84443 ASSAY THYROID STIM HORMONE: CPT | Performed by: EMERGENCY MEDICINE

## 2025-06-12 PROCEDURE — 99285 EMERGENCY DEPT VISIT HI MDM: CPT | Performed by: EMERGENCY MEDICINE

## 2025-06-12 PROCEDURE — 96375 TX/PRO/DX INJ NEW DRUG ADDON: CPT

## 2025-06-12 PROCEDURE — 96365 THER/PROPH/DIAG IV INF INIT: CPT

## 2025-06-12 PROCEDURE — 36415 COLL VENOUS BLD VENIPUNCTURE: CPT | Performed by: EMERGENCY MEDICINE

## 2025-06-12 PROCEDURE — 99205 OFFICE O/P NEW HI 60 MIN: CPT | Performed by: PHYSICIAN ASSISTANT

## 2025-06-12 PROCEDURE — 87811 SARS-COV-2 COVID19 W/OPTIC: CPT | Performed by: EMERGENCY MEDICINE

## 2025-06-12 PROCEDURE — 84484 ASSAY OF TROPONIN QUANT: CPT | Performed by: EMERGENCY MEDICINE

## 2025-06-12 PROCEDURE — 87804 INFLUENZA ASSAY W/OPTIC: CPT | Performed by: EMERGENCY MEDICINE

## 2025-06-12 PROCEDURE — 85025 COMPLETE CBC W/AUTO DIFF WBC: CPT | Performed by: EMERGENCY MEDICINE

## 2025-06-12 PROCEDURE — 71250 CT THORAX DX C-: CPT

## 2025-06-12 PROCEDURE — 99285 EMERGENCY DEPT VISIT HI MDM: CPT

## 2025-06-12 PROCEDURE — 83880 ASSAY OF NATRIURETIC PEPTIDE: CPT | Performed by: EMERGENCY MEDICINE

## 2025-06-12 PROCEDURE — 80053 COMPREHEN METABOLIC PANEL: CPT | Performed by: EMERGENCY MEDICINE

## 2025-06-12 PROCEDURE — 85379 FIBRIN DEGRADATION QUANT: CPT | Performed by: EMERGENCY MEDICINE

## 2025-06-12 RX ORDER — LIDOCAINE 50 MG/G
1 PATCH TOPICAL ONCE
Status: DISCONTINUED | OUTPATIENT
Start: 2025-06-12 | End: 2025-06-12 | Stop reason: HOSPADM

## 2025-06-12 RX ORDER — KETOROLAC TROMETHAMINE 30 MG/ML
15 INJECTION, SOLUTION INTRAMUSCULAR; INTRAVENOUS ONCE
Status: COMPLETED | OUTPATIENT
Start: 2025-06-12 | End: 2025-06-12

## 2025-06-12 RX ORDER — ACETAMINOPHEN 10 MG/ML
1000 INJECTION, SOLUTION INTRAVENOUS ONCE
Status: COMPLETED | OUTPATIENT
Start: 2025-06-12 | End: 2025-06-12

## 2025-06-12 RX ADMIN — ACETAMINOPHEN 1000 MG: 10 INJECTION INTRAVENOUS at 13:25

## 2025-06-12 RX ADMIN — KETOROLAC TROMETHAMINE 15 MG: 30 INJECTION, SOLUTION INTRAMUSCULAR; INTRAVENOUS at 13:22

## 2025-06-12 RX ADMIN — LIDOCAINE 1 PATCH: 50 PATCH CUTANEOUS at 13:27

## 2025-06-12 NOTE — ED PROVIDER NOTES
Time reflects when diagnosis was documented in both MDM as applicable and the Disposition within this note       Time User Action Codes Description Comment    6/12/2025  2:57 PM Esperanza Godinez Add [R07.89] Chest wall pain           ED Disposition       ED Disposition   Discharge    Condition   Stable    Date/Time   Thu Jun 12, 2025  3:24 PM    Comment   Shanita Villegas discharge to home/self care.                   Assessment & Plan       Medical Decision Making  Pt is a 81yo F who presents with rib pain.     Differential diagnosis to include but not limited to muscular strain, contusion, fracture, pneumonia, pneumothorax, PE.  Will plan for labs and imaging with modality to be based on dimer result. Will treat symptomatically and reassess. See ED course for results and details.    Plan to discharge pt with f/u to PCP and cardiology. Discussed returning the ED with new or worsening of symptoms. Discussed use of over the counter medications as stated on the bottle as needed for pain. Pt expressed understanding of discharge instructions, return precautions, and medication instructions and is stable for discharge at this time. All questions were answered and pt was discharged without incident.         Amount and/or Complexity of Data Reviewed  Labs: ordered. Decision-making details documented in ED Course.  Radiology: ordered. Decision-making details documented in ED Course.  ECG/medicine tests:  Decision-making details documented in ED Course.    Risk  Prescription drug management.        ED Course as of 06/12/25 1526   Thu Jun 12, 2025   1228 ECG 12 lead  Procedure Note: EKG  Date/Time: 06/12/25 12:28 PM   Interpreted by: Esperanza Godinez MD  Indications / Diagnosis: CP  ECG reviewed by me, the ED Physician: yes   The EKG demonstrates:  Rhythm: normal sinus  Intervals: normal intervals  Axis: LAD  QRS/Blocks: normal QRS  ST Changes: No acute ST Changes, no STD/YASMINE.  Q waves in septal leads.  Only prior available  for comparison from  earlier today.    1257 WBC(!): 14.19  Mildly elevated. Non-diagnostic.    1257 CBC and differential(!)  Reviewed and without actionable derangement.    1311 Comprehensive metabolic panel  Reviewed and without actionable derangement.    1313 FLU/COVID Rapid Antigen (30 min. TAT) - Preferred screening test in ED  Negative.    1316 D-Dimer, Quant: <0.27  Negative. No further w/u for PE indicated.    1319 hs TnI 0hr: 5  Will require delta.    1320 BNP: 28  WNL   1329 TSH 3RD GENERATON: 1.489  WNL   1429 CT chest without contrast  No acute intrathoracic abnormality.  No right chest wall abnormality. No fracture.  Additional incidental findings, will make pt aware of need for repeat imaging.    1439 Pt reassessed and resting comfortably. Pt made aware of all results (including incidental findings) and plan for DC if delta trop WNL. Pt expressed understanding and is agreeable.    1456 Delta trop in process.    1524 Delta 2hr hsTnI: 0  WNL       Medications   lidocaine (LIDODERM) 5 % patch 1 patch (1 patch Topical Medication Applied 6/12/25 1327)   acetaminophen (Ofirmev) injection 1,000 mg (0 mg Intravenous Stopped 6/12/25 1448)   ketorolac (TORADOL) injection 15 mg (15 mg Intravenous Given 6/12/25 1322)       ED Risk Strat Scores   HEART Risk Score      Flowsheet Row Most Recent Value   Heart Score Risk Calculator    History 0 Filed at: 06/12/2025 1457   ECG 1 Filed at: 06/12/2025 1457   Age 2 Filed at: 06/12/2025 1457   Risk Factors 1 Filed at: 06/12/2025 1457   Troponin 0 Filed at: 06/12/2025 1457   HEART Score 4 Filed at: 06/12/2025 1457          HEART Risk Score      Flowsheet Row Most Recent Value   Heart Score Risk Calculator    History 0 Filed at: 06/12/2025 1457   ECG 1 Filed at: 06/12/2025 1457   Age 2 Filed at: 06/12/2025 1457   Risk Factors 1 Filed at: 06/12/2025 1457   Troponin 0 Filed at: 06/12/2025 1457   HEART Score 4 Filed at: 06/12/2025 1457                      No data  recorded        SBIRT 20yo+      Flowsheet Row Most Recent Value   Initial Alcohol Screen: US AUDIT-C     1. How often do you have a drink containing alcohol? 0 Filed at: 06/12/2025 1216   3b. FEMALE Any Age, or MALE 65+: How often do you have 4 or more drinks on one occassion? 0 Filed at: 06/12/2025 1216   Audit-C Score 0 Filed at: 06/12/2025 1216   SEAN: How many times in the past year have you...    Used an illegal drug or used a prescription medication for non-medical reasons? Never Filed at: 06/12/2025 1216                            History of Present Illness       Chief Complaint   Patient presents with    Evaluation of Abnormal Diagnostic Test     Sent from [facility] for abnormal EKG. States right posterior rib pain started intermittently with fatigue 4 days ago. Now constant pain.        Past Medical History[1]   Past Surgical History[2]   Family History[3]   Social History[4]   E-Cigarette/Vaping    E-Cigarette Use Never User       E-Cigarette/Vaping Substances    Nicotine No     THC No     CBD No     Flavoring No     Other No     Unknown No       I have reviewed and agree with the history as documented.     Pt is a 79yo F who presents for rib pain.  Patient reports that 4 days ago she woke up with right-sided lateral rib pain.  Patient reports she thought she just slept wrong and did not think much of the pain.  Patient reports she took naproxen and continued on with her daily activities.  Patient reports that the next day her pain was improved, however it has been fluctuating and still present since that time.  Patient then presented to urgent care who found her to have a left axis deviation and subsequently sent patient for further evaluation.  Patient denies any anterior chest pain.  Patient denies any shortness of breath.  Patient reports the pain is not pleuritic or exertional.  Patient states the pain does worsen with movement.  Patient denies any trauma or injury to the area.        Objective        ED Triage Vitals   Temperature Pulse Blood Pressure Respirations SpO2 Patient Position - Orthostatic VS   06/12/25 1215 06/12/25 1229 06/12/25 1229 06/12/25 1229 06/12/25 1229 06/12/25 1229   98.5 °F (36.9 °C) 69 158/76 18 98 % Sitting      Temp Source Heart Rate Source BP Location FiO2 (%) Pain Score    06/12/25 1215 06/12/25 1229 06/12/25 1229 -- 06/12/25 1215    Tympanic Monitor Right arm  6      Vitals      Date and Time Temp Pulse SpO2 Resp BP Pain Score FACES Pain Rating User   06/12/25 1500 -- 65 96 % 18 149/75 -- -- [facility]   06/12/25 1430 -- 69 97 % 20 166/91 2 -- [facility]   06/12/25 1400 -- 68 98 % 15 152/85 -- --    06/12/25 1330 -- 65 97 % 20 144/76 -- --    06/12/25 1322 -- -- -- -- -- 4 -- [facility]   06/12/25 1229 -- 69 98 % 18 158/76 5 -- [facility]   06/12/25 1215 98.5 °F (36.9 °C) -- -- -- -- 6 --             Physical Exam  Vitals reviewed.   Constitutional:       General: She is not in acute distress.     Appearance: She is well-developed. She is not toxic-appearing or diaphoretic.   HENT:      Head: Normocephalic and atraumatic.      Right Ear: External ear normal.      Left Ear: External ear normal.      Nose: Nose normal.      Mouth/Throat:      Pharynx: Oropharynx is clear.     Eyes:      Extraocular Movements: Extraocular movements intact.      Conjunctiva/sclera: Conjunctivae normal.      Pupils: Pupils are equal, round, and reactive to light.       Cardiovascular:      Rate and Rhythm: Normal rate and regular rhythm.      Heart sounds: Normal heart sounds.   Pulmonary:      Effort: Pulmonary effort is normal. No respiratory distress.      Breath sounds: Normal breath sounds. No wheezing.   Chest:      Chest wall: No tenderness.   Abdominal:      General: Bowel sounds are normal. There is no distension.      Palpations: Abdomen is soft.      Tenderness: There is no abdominal tenderness.     Musculoskeletal:         General: Normal range of motion.      Cervical back: Normal range of motion and neck  supple.      Right lower leg: No edema.      Left lower leg: No edema.     Skin:     General: Skin is warm and dry.      Capillary Refill: Capillary refill takes less than 2 seconds.     Neurological:      General: No focal deficit present.      Mental Status: She is alert and oriented to person, place, and time.     Psychiatric:         Speech: Speech normal.         Behavior: Behavior is cooperative.         Results Reviewed       Procedure Component Value Units Date/Time    HS Troponin I 2hr [[id number]]  (Normal) Collected: 06/12/25 1448    Lab Status: Final result Specimen: Blood from Arm, Left Updated: 06/12/25 1523     hs TnI 2hr 5 ng/L      Delta 2hr hsTnI 0 ng/L     HS Troponin I 4hr [[id number]]     Lab Status: No result Specimen: Blood     TSH, 3rd generation with Free T4 reflex [[id number]]  (Normal) Collected: 06/12/25 1246    Lab Status: Final result Specimen: Blood from Arm, Left Updated: 06/12/25 1328     TSH 3RD GENERATION 1.489 uIU/mL     B-Type Natriuretic Peptide(BNP) [[id number]]  (Normal) Collected: 06/12/25 1246    Lab Status: Final result Specimen: Blood from Arm, Left Updated: 06/12/25 1319     BNP 28 pg/mL     HS Troponin 0hr (reflex protocol) [[id number]]  (Normal) Collected: 06/12/25 1246    Lab Status: Final result Specimen: Blood from Arm, Left Updated: 06/12/25 1318     hs TnI 0hr 5 ng/L     D-Dimer [[id number]]  (Normal) Collected: 06/12/25 1246    Lab Status: Final result Specimen: Blood from Arm, Left Updated: 06/12/25 1316     D-Dimer, Quant <0.27 ug/ml FEU     Narrative:      In the evaluation for possible pulmonary embolism, in the appropriate (Well's Score of 4 or less) patient, the age adjusted d-dimer cutoff for this patient can be calculated as:    Age x 0.01 (in ug/mL) for Age-adjusted D-dimer exclusion threshold for a patient over 50 years.    FLU/COVID Rapid Antigen (30 min. TAT) - Preferred screening test in ED [[id number]]  (Normal) Collected: 06/12/25 1246    Lab Status: Final  result Specimen: Nares from Nose Updated: 06/12/25 1313     SARS COV Rapid Antigen Negative     Influenza A Rapid Antigen Negative     Influenza B Rapid Antigen Negative    Narrative:      This test has been performed using the [employer] Claire 2 FLU+SARS Antigen test under the Emergency Use Authorization (EUA). This test has been validated by the  and verified by the performing laboratory. The Claire uses lateral flow immunofluorescent sandwich assay to detect SARS-COV, Influenza A and Influenza B Antigen.     The [employer] Claire 2 SARS Antigen test does not differentiate between SARS-CoV and SARS-CoV-2.     Negative results are presumptive and may be confirmed with a molecular assay, if necessary, for patient management. Negative results do not rule out SARS-CoV-2 or influenza infection and should not be used as the sole basis for treatment or patient management decisions. A negative test result may occur if the level of antigen in a sample is below the limit of detection of this test.     Positive results are indicative of the presence of viral antigens, but do not rule out bacterial infection or co-infection with other viruses.     All test results should be used as an adjunct to clinical observations and other information available to the provider.    FOR PEDIATRIC PATIENTS - copy/paste COVID Guidelines URL to browser: [email or web address]    Comprehensive metabolic panel [[id number]] Collected: 06/12/25 1246    Lab Status: Final result Specimen: Blood from Arm, Left Updated: 06/12/25 1311     Sodium 137 mmol/L      Potassium 3.9 mmol/L      Chloride 104 mmol/L      CO2 26 mmol/L      ANION GAP 7 mmol/L      BUN 18 mg/dL      Creatinine 0.62 mg/dL      Glucose 97 mg/dL      Calcium 9.5 mg/dL      AST 16 U/L      ALT 14 U/L      Alkaline Phosphatase 97 U/L      Total Protein 6.8 g/dL      Albumin 4.0 g/dL      Total Bilirubin 0.52 mg/dL      eGFR 86  ml/min/1.73sq m     Narrative:      National Kidney Disease Foundation guidelines for Chronic Kidney Disease (CKD):     Stage 1 with normal or high GFR (GFR > 90 mL/min/1.73 square meters)    Stage 2 Mild CKD (GFR = 60-89 mL/min/1.73 square meters)    Stage 3A Moderate CKD (GFR = 45-59 mL/min/1.73 square meters)    Stage 3B Moderate CKD (GFR = 30-44 mL/min/1.73 square meters)    Stage 4 Severe CKD (GFR = 15-29 mL/min/1.73 square meters)    Stage 5 End Stage CKD (GFR <15 mL/min/1.73 square meters)  Note: GFR calculation is accurate only with a steady state creatinine    CBC and differential [[id number]]  (Abnormal) Collected: 06/12/25 1246    Lab Status: Final result Specimen: Blood from Arm, Left Updated: 06/12/25 1256     WBC 14.19 Thousand/uL      RBC 4.36 Million/uL      Hemoglobin 13.2 g/dL      Hematocrit 40.2 %      MCV 92 fL      MCH 30.3 pg      MCHC 32.8 g/dL      RDW 12.4 %      MPV 10.9 fL      Platelets 234 Thousands/uL      nRBC 0 /100 WBCs      Segmented % 31 %      Immature Grans % 0 %      Lymphocytes % 62 %      Monocytes % 6 %      Eosinophils Relative 1 %      Basophils Relative 0 %      Absolute Neutrophils 4.36 Thousands/µL      Absolute Immature Grans 0.02 Thousand/uL      Absolute Lymphocytes 8.77 Thousands/µL      Absolute Monocytes 0.82 Thousand/µL      Eosinophils Absolute 0.17 Thousand/µL      Basophils Absolute 0.05 Thousands/µL             CT chest without contrast   Final Interpretation by Alex Mason MD (06/12 4892)      No acute intrathoracic abnormality.   No right chest wall abnormality. No fracture.      Pulmonary nodules as described measuring up to 7 mm in the right upper lobe.   This nodule demonstrates a polygonal shape which favors a benign etiology/lymph node.   4.2 cm fusiform ascending thoracic aortic aneurysm.   Follow-up chest CT recommended in 12 months to reevaluate the above findings.      The study was marked in EPIC for immediate notification.       Computerized Assisted Algorithm (CAA) may have aided analysis of applicable images.      Workstation performed: [id number]             Procedures    ED Medication and Procedure Management   Prior to Admission Medications   Prescriptions Last Dose Informant Patient Reported? Taking?   Cyanocobalamin (Vitamin B-12) 5000 MCG TBDP   Yes No   Sig: Take by mouth   aspirin (Aspirin 81) 81 mg EC tablet   Yes No   Sig: Take 81 mg by mouth in the morning.   cetirizine (ZyrTEC) 10 mg tablet   Yes No   Sig: Take 10 mg by mouth in the morning.   diltiazem (CARDIZEM CD) 240 mg 24 hr capsule   Yes No   Sig: Take 1 capsule by mouth in the morning   omeprazole (PriLOSEC) 20 mg delayed release capsule   No No   Sig: Take 1 capsule (20 mg total) by mouth daily   rosuvastatin (CRESTOR) 5 mg tablet   No No   Sig: Take 1 tablet (5 mg total) by mouth daily      Facility-Administered Medications: None     Patient's Medications   Discharge Prescriptions    No medications on file       ED SEPSIS DOCUMENTATION   Time reflects when diagnosis was documented in both MDM as applicable and the Disposition within this note       Time User Action Codes Description Comment    6/12/2025  2:57 PM Esperanza Godinez Add [R07.89] Chest wall pain                    [1]   Past Medical History:  Diagnosis Date    Allergic rhinitis     GERD (gastroesophageal reflux disease)     Hypertension     Urinary tract infection    [2]   Past Surgical History:  Procedure Laterality Date    BLADDER REPAIR      BREAST LUMPECTOMY Right     benign    JOINT REPLACEMENT Right     TKR   [3]   Family History  Problem Relation Name Age of Onset    Other (chronic lymp) Mother      Coronary artery disease Father      Kidney failure Father      Hypothyroidism Sister      Liver cancer Sister      Diabetes Brother      Hypothyroidism Brother      Lung cancer Maternal Aunt     [4]   Social History  Tobacco Use    Smoking status: Never    Smokeless tobacco: Never   Vaping Use     Vaping status: Never Used   Substance Use Topics    Alcohol use: Yes     Comment: social    Drug use: Never        Esperanza Godinez MD  06/12/25 7022

## 2025-06-12 NOTE — DISCHARGE INSTRUCTIONS
Follow-up with primary care for further care. Contact info provided below if needed.  Cardiology will contact you for follow up.   Use over the counter medications as stated on the bottle as needed for pain control.  - Tylenol  - Ibuprofen  - Lidocaine patch (you can leave the current one on for 12 hours)  Return to the ED with new or worsening symptoms.

## 2025-06-12 NOTE — PROGRESS NOTES
[facility]        NAME: Shanita Villegas is a 80 y.o. female  : 1945    MRN: [id number]  DATE: 2025  TIME: 11:54 AM    Assessment and Plan   Chest pain, unspecified type [R07.9]  1. Chest pain, unspecified type  POCT ECG    Transfer to other facility    CANCELED: XR chest pa and lateral        EKG with QS complex in both V1 and V3 raises suspicion for possible anterior MI; recommend ED for troponins and further eval. Pt agreeable and feels comfortable driving herself. She is not in any acute distress and vitals are WNL.    Patient Instructions       Follow up with PCP in 3-5 days.  Proceed to  ER if symptoms worsen.    Chief Complaint     Chief Complaint   Patient presents with    Chest Pain     Right sided rib pain for 4 days.          History of Present Illness       80 year old presents with 4 days of posterior right upper back pain. She states she woke up this way. She describes it as a weird and exhausting pain, but is not burning. The pain does not cross midline. Nothing makes it worse and slightly relieved with stretching her right arm. No relation to breathing. She denies any anterior chest pain, SOB, cough, hemoptysis, numbness, fever, chills, or syncope. She denies all flu-like symptoms. She denies any trauma. She has had shingles in the past and is vaccinated for it. She does not know if this feels the same as shingles. No recent travel, surgery, and no history of DVT/PE.         Review of Systems   Review of Systems   Constitutional:  Positive for fatigue. Negative for chills and fever.   HENT:  Negative for congestion, rhinorrhea, sinus pressure, sinus pain, sore throat and trouble swallowing.    Eyes:  Negative for photophobia and visual disturbance.   Respiratory:  Negative for cough, chest tightness and shortness of breath.    Cardiovascular:  Negative for chest pain, palpitations and leg swelling.   Gastrointestinal:  Negative for nausea and vomiting.   Genitourinary:   "Negative for dysuria, flank pain, pelvic pain and vaginal bleeding.   Musculoskeletal:  Positive for back pain.   Skin:  Negative for rash and wound.   Neurological:  Negative for dizziness, syncope, weakness, light-headedness and numbness.         Current Medications     Current Medications[1]    Current Allergies     Allergies as of 06/12/2025    (No Known Allergies)            The following portions of the patient's history were reviewed and updated as appropriate: allergies, current medications, past family history, past medical history, past social history, past surgical history and problem list.     Past Medical History[2]    Past Surgical History[3]    Family History[4]      Medications have been verified.        Objective   /76   Pulse 90   Temp 97.8 °F (36.6 °C)   Resp 18   Ht 5' 5\" (1.651 m)   Wt 67.1 kg (148 lb)   SpO2 98%   BMI 24.63 kg/m²        Physical Exam     Physical Exam  Constitutional:       General: She is not in acute distress.     Appearance: She is well-developed. She is not ill-appearing.   HENT:      Head: Normocephalic and atraumatic.     Eyes:      Extraocular Movements: Extraocular movements intact.      Conjunctiva/sclera: Conjunctivae normal.      Pupils: Pupils are equal, round, and reactive to light.       Cardiovascular:      Rate and Rhythm: Normal rate and regular rhythm.      Heart sounds: Normal heart sounds.   Pulmonary:      Effort: Pulmonary effort is normal. No respiratory distress.      Breath sounds: Normal breath sounds. No stridor. No wheezing or rales.     Musculoskeletal:         General: No tenderness. Normal range of motion.      Cervical back: Normal range of motion and neck supple.      Right lower leg: No edema.      Left lower leg: No edema.      Comments: Pain is not reproducible     Skin:     General: Skin is warm and dry.      Capillary Refill: Capillary refill takes less than 2 seconds.      Findings: No bruising, erythema, lesion or rash. "     Neurological:      General: No focal deficit present.      Mental Status: She is alert.                          [1]   Current Outpatient Medications:     aspirin (Aspirin 81) 81 mg EC tablet, Take 81 mg by mouth in the morning., Disp: , Rfl:     cetirizine (ZyrTEC) 10 mg tablet, Take 10 mg by mouth in the morning., Disp: , Rfl:     Cyanocobalamin (Vitamin B-12) 5000 MCG TBDP, Take by mouth, Disp: , Rfl:     diltiazem (CARDIZEM CD) 240 mg 24 hr capsule, Take 1 capsule by mouth in the morning, Disp: , Rfl:     omeprazole (PriLOSEC) 20 mg delayed release capsule, Take 1 capsule (20 mg total) by mouth daily, Disp: 90 capsule, Rfl: 1    rosuvastatin (CRESTOR) 5 mg tablet, Take 1 tablet (5 mg total) by mouth daily, Disp: 30 tablet, Rfl: 0  [2]   Past Medical History:  Diagnosis Date    Allergic rhinitis     GERD (gastroesophageal reflux disease)     Hypertension     Urinary tract infection    [3]   Past Surgical History:  Procedure Laterality Date    BLADDER REPAIR      BREAST LUMPECTOMY Right     benign    JOINT REPLACEMENT Right     TKR   [4]   Family History  Problem Relation Name Age of Onset    Other (chronic lymp) Mother      Coronary artery disease Father      Kidney failure Father      Hypothyroidism Sister      Liver cancer Sister      Diabetes Brother      Hypothyroidism Brother      Lung cancer Maternal Aunt

## 2025-06-13 LAB
ATRIAL RATE: 67 BPM
ATRIAL RATE: 68 BPM
P AXIS: 39 DEGREES
P AXIS: 46 DEGREES
PR INTERVAL: 192 MS
PR INTERVAL: 196 MS
QRS AXIS: -35 DEGREES
QRS AXIS: -49 DEGREES
QRSD INTERVAL: 86 MS
QRSD INTERVAL: 90 MS
QT INTERVAL: 404 MS
QT INTERVAL: 406 MS
QTC INTERVAL: 426 MS
QTC INTERVAL: 432 MS
T WAVE AXIS: 46 DEGREES
T WAVE AXIS: 51 DEGREES
VENTRICULAR RATE: 67 BPM
VENTRICULAR RATE: 68 BPM

## 2025-06-13 PROCEDURE — 93010 ELECTROCARDIOGRAM REPORT: CPT | Performed by: INTERNAL MEDICINE

## 2025-06-18 ENCOUNTER — CONSULT (OUTPATIENT)
Age: 80
End: 2025-06-18
Attending: INTERNAL MEDICINE
Payer: MEDICARE

## 2025-06-18 VITALS — HEIGHT: 65 IN | WEIGHT: 146.6 LBS | BODY MASS INDEX: 24.43 KG/M2 | TEMPERATURE: 97.6 F

## 2025-06-18 DIAGNOSIS — L82.1 SEBORRHEIC KERATOSES: Primary | ICD-10-CM

## 2025-06-18 DIAGNOSIS — D22.5 MULTIPLE BENIGN MELANOCYTIC NEVI OF UPPER EXTREMITY, LOWER EXTREMITY, AND TRUNK: ICD-10-CM

## 2025-06-18 DIAGNOSIS — D22.60 MULTIPLE BENIGN MELANOCYTIC NEVI OF UPPER EXTREMITY, LOWER EXTREMITY, AND TRUNK: ICD-10-CM

## 2025-06-18 DIAGNOSIS — Z85.828 HISTORY OF SKIN CANCER: ICD-10-CM

## 2025-06-18 DIAGNOSIS — Z85.89 HISTORY OF SQUAMOUS CELL CARCINOMA: ICD-10-CM

## 2025-06-18 DIAGNOSIS — L81.4 SOLAR LENTIGO: ICD-10-CM

## 2025-06-18 DIAGNOSIS — D18.01 CHERRY ANGIOMA: ICD-10-CM

## 2025-06-18 DIAGNOSIS — L57.0 KERATOSIS, ACTINIC: ICD-10-CM

## 2025-06-18 DIAGNOSIS — D22.70 MULTIPLE BENIGN MELANOCYTIC NEVI OF UPPER EXTREMITY, LOWER EXTREMITY, AND TRUNK: ICD-10-CM

## 2025-06-18 DIAGNOSIS — Z85.828 HISTORY OF BASAL CELL CARCINOMA: ICD-10-CM

## 2025-06-18 PROCEDURE — 17003 DESTRUCT PREMALG LES 2-14: CPT | Performed by: DERMATOLOGY

## 2025-06-18 PROCEDURE — 99204 OFFICE O/P NEW MOD 45 MIN: CPT | Performed by: DERMATOLOGY

## 2025-06-18 PROCEDURE — 17000 DESTRUCT PREMALG LESION: CPT | Performed by: DERMATOLOGY

## 2025-06-18 NOTE — PATIENT INSTRUCTIONS
HISTORY OF BASAL CELL CARCINOMA    Assessment and Plan:  Based on a thorough discussion of this condition and the management approach to it (including a comprehensive discussion of the known risks, side effects and potential benefits of treatment), the patient (family) agrees to implement the following specific plan:  When outside we recommend using a wide brim hat, sunglasses, long sleeve and pants, sunscreen with SPF 30+ with reapplication every 2 hours, or SPF specific clothing      How can basal cell carcinoma be prevented?  The most important way to prevent BCC is to avoid sunburn. This is especially important in childhood and early life. Fair skinned individuals and those with a personal or family history of BCC should protect their skin from sun exposure daily, year-round and lifelong.  Stay indoors or under the shade in the middle of the day   Wear covering clothing   Apply high protection factor SPF50+ broad-spectrum sunscreens generously to exposed skin if outdoors   Avoid indoor tanning (sun beds, solaria)  Oral nicotinamide (vitamin B3) in a dose of 500 mg twice daily may reduce the number and severity of BCCs.    What is the outlook for basal cell carcinoma?  Most BCCs are cured by treatment. Cure is most likely if treatment is undertaken when the lesion is small.  About 50% of people with BCC develop a second one within 3 years of the first. They are also at increased risk of other skin cancers, especially melanoma. Regular self-skin examinations and long-term annual skin checks by an experienced health professional are recommended.     HISTORY OF SQUAMOUS CELL CARCINOMA     Assessment and Plan:  Based on a thorough discussion of this condition and the management approach to it (including a comprehensive discussion of the known risks, side effects and potential benefits of treatment), the patient (family) agrees to implement the following specific plan:  When outside we recommend using a wide brim  hat, sunglasses, long sleeve and pants, sunscreen with SPF 30+ with reapplication every 2 hours, or SPF specific clothing      How can SCC be prevented?  The most important way to prevent SCC is to avoid sunburn. This is especially important in childhood and early life. Fair skinned individuals and those with a personal or family history of BCC should protect their skin from sun exposure daily, year-round and lifelong.  Stay indoors or under the shade in the middle of the day   Wear covering clothing   Apply high protection factor SPF50+ broad-spectrum sunscreens generously to exposed skin if outdoors   Avoid indoor tanning (sun beds, solaria)      What is the outlook for SCC?  Most SCC are cured by treatment. Cure is most likely if treatment is undertaken when the lesion is small. A small percent of SCC's can spread to lymph  nodes and long term monitoring is indicated.   They are also at increased risk of other skin cancers, especially melanoma. Regular self-skin examinations and long-term annual skin checks by an experienced health professional are recommended.     SEBORRHEIC KERATOSES     5 skin colored Sk's each approximately 3-4 mm on the right forehead. Informed insurance does not cover for removal schedule for cosmetic visit to remove.   Price for treatment provided $150 to treat up to 10 lesions, and if over 10 lesions, then additional $10/lesion thereafter.   - Relevant exam: Scattered over the trunk/extremities are waxy brown to black plaques and papules with stuck on appearance  - Exam and clinical history consistent with seborrheic keratoses  - Counseled that these are benign growths that do not require treatment  - Counseled that removal of lesions is considered cosmetic and so would incur a fee should patient elect to move forward.   - Patient to hold on treatments for now but will inform us should they desire additional treatments    MELANOCYTIC NEVI  -Relevant exam: Scattered over the  trunk/extremities are homogenously pigmented brown macules and papules. ELM performed and without concerning findings.  - Exam and clinical history consistent with melanocytic nevi  - Educated on the ABCDE's of melanoma; handout provided  - Counseled to return to clinic prior to scheduled appointment should any of these lesions change or should any new lesions of concern arise  - Counseled on use of sun protection daily. Reviewed latest FDA sunscreen guidelines, including use of broad spectrum (UVA and UVB blocking) sunscreen or sun protective clothing with SPF 30-50 every 2-3 hours and reapplied after exposure to water; use of photoprotective clothing, including a broad brim hat and UPF rated clothing if outdoors for several hours; avoid use of tanning beds as these pose significant risk for melanoma and skin cancer.    LENTIGINES  OTHER SKIN CHANGES DUE TO CHRONIC EXPOSURE TO NONIONIZING RADIATION  - Relevant exam: Over sun exposed areas are brown macules. ELM performed and without concerning findings.  - Exam and clinical history consistent with lentigines.  - Educated that these are indicative of prior sun exposure.   - Counseled to return to clinic prior to scheduled appointment should any of these lesions change or should any new lesions of concern arise.  - Recommended use of sunscreen as above and below.  - Counseled on use of sun protection daily. Reviewed latest FDA sunscreen guidelines, including use of broad spectrum (UVA and UVB blocking) sunscreen or sun protective clothing with SPF 30-50 every 2-3 hours and reapplied after exposure to water; use of photoprotective clothing, including a broad brim hat and UPF rated clothing if outdoors for several hours; avoid use of tanning beds as these pose significant risk for melanoma and skin cancer.    CHERRY ANGIOMAS  - Relevant exam: Scattered over the trunk/extremities are red papules  - Exam and clinical history consistent with cherry angiomas  - Educated  that these are benign  - Educated that removal is considered aesthetic and would incur a fee.  - Patient does not wish to pursue removal at this time but will contact us should this change.    ACTINIC KERATOSES  - Relevant exam: On the lateral right cheek and right side of nose are gritty pink papules  - Exam and clinical history consistent with actinic keratoses  - Discussed that these lesions are considered premalignant with the potential to evolve into squamous cell carcinoma.   - Discussed that these are due to chronic sun exposure and therefore recommend use of sunscreen/sun protection to prevent further sun damage  - Discussed treatment options, including liquid nitrogen destruction, topical immunotherapy, and photodynamic therapy, including risks, benefits        PROCEDURE:  DESTRUCTION OF PRE-MALIGNANT LESIONS    - After a thorough discussion of treatment options and risk/benefits/alternatives (including but not limited to local pain, scarring, dyspigmentation, blistering, and possible superinfection), verbal and written consent were obtained and the aforementioned lesions were treated on with cryotherapy using liquid nitrogen x 1 cycle for 5-10 seconds.    TOTAL NUMBER of 2 pre-malignant lesions were treated today on the ANATOMIC LOCATION: lateral right cheek and right side of nose.     The patient tolerated the procedure well, and after-care instructions were provided.

## 2025-06-18 NOTE — PROGRESS NOTES
"[facility] Dermatology Clinic Note     Patient Name: Shanita Villegas  Encounter Date: 6/18/25       Have you been cared for by a [facility] Dermatologist in the last 3 years and, if so, which description applies to you? NO. I am considered a \"new\" patient and must complete all patient intake questions. I am of child-bearing potential.     REVIEW OF SYSTEMS:  Have you recently had or currently have any of the following? Recent fever or chills? No  Any non-healing wound? No  Are you pregnant or planning to become pregnant? No  Are you currently or planning to be nursing or breast feeding? No   PAST MEDICAL HISTORY:  Have you personally ever had or currently have any of the following?  If \"YES,\" then please provide more detail. Skin cancer (such as Melanoma, Basal Cell Carcinoma, Squamous Cell Carcinoma?  YES, BCC, SCC  Tuberculosis, HIV/AIDS, Hepatitis B or C: No  Radiation Treatment No   HISTORY OF IMMUNOSUPPRESSION:   Do you have a history of any of the following:  Systemic Immunosuppression such as Diabetes, Biologic or Immunotherapy, Chemotherapy, Organ Transplantation, Bone Marrow Transplantation or Prednsione?  No    Answering \"YES\" requires the addition of the dotphrase \"IMMUNOSUPPRESSED\" as the first diagnosis of the patient's visit.   FAMILY HISTORY:  Any \"first degree relatives\" (parent, brother, sister, or child) with the following?    Skin Cancer, Pancreatic or Other Cancer? YES, Sister Liver cancer, Mother CLL   PATIENT EXPERIENCE:    Do you want the Dermatologist to perform a COMPLETE skin exam today including a clinical examination under the \"bra and underwear\" areas?  Yes  If necessary, do we have your permission to call and leave a detailed message on your Preferred Phone number that includes your specific medical information?  Yes      Allergies[1] Current Medications[2]              Whom besides the patient is providing clinical information about today's encounter?   NO ADDITIONAL HISTORIAN (patient " alone provided history)    Physical Exam and Assessment/Plan by Diagnosis:    Patient is here for skin cancer screening, personal history of skin cancer BCC, SCC.   Chief Complaint:  spot on the right side of nose bled a month ago once. No family history of skin cancer.    HISTORY OF BASAL CELL CARCINOMA    Physical Exam:  Anatomic Location Affected:  Nose, left cheek, right clavicle, right leg, Back  Morphological Description of scar:  well healed scars  Suspected Recurrence: No  Pertinent Positives:  Pertinent Negatives:      Additional History of Present Condition:  History of basal cell carcinoma with no sign of recurrence    Assessment and Plan:  Based on a thorough discussion of this condition and the management approach to it (including a comprehensive discussion of the known risks, side effects and potential benefits of treatment), the patient (family) agrees to implement the following specific plan:  When outside we recommend using a wide brim hat, sunglasses, long sleeve and pants, sunscreen with SPF 30+ with reapplication every 2 hours, or SPF specific clothing      How can basal cell carcinoma be prevented?  The most important way to prevent BCC is to avoid sunburn. This is especially important in childhood and early life. Fair skinned individuals and those with a personal or family history of BCC should protect their skin from sun exposure daily, year-round and lifelong.  Stay indoors or under the shade in the middle of the day   Wear covering clothing   Apply high protection factor SPF50+ broad-spectrum sunscreens generously to exposed skin if outdoors   Avoid indoor tanning (sun beds, solaria)  Oral nicotinamide (vitamin B3) in a dose of 500 mg twice daily may reduce the number and severity of BCCs.    What is the outlook for basal cell carcinoma?  Most BCCs are cured by treatment. Cure is most likely if treatment is undertaken when the lesion is small.  About 50% of people with BCC develop a second  one within 3 years of the first. They are also at increased risk of other skin cancers, especially melanoma. Regular self-skin examinations and long-term annual skin checks by an experienced health professional are recommended.     HISTORY OF SQUAMOUS CELL CARCINOMA     Physical Exam:  Anatomic Location Affected:  Patient can't recall location since it's been 30 years  Morphological Description of Scar:  well healed  Suspected Recurrence: no  Regional adenopathy: no    Additional History of Present Condition:  History of Squamous Cell Carcinoma with no sign of recurrence    Assessment and Plan:  Based on a thorough discussion of this condition and the management approach to it (including a comprehensive discussion of the known risks, side effects and potential benefits of treatment), the patient (family) agrees to implement the following specific plan:  When outside we recommend using a wide brim hat, sunglasses, long sleeve and pants, sunscreen with SPF 30+ with reapplication every 2 hours, or SPF specific clothing      How can SCC be prevented?  The most important way to prevent SCC is to avoid sunburn. This is especially important in childhood and early life. Fair skinned individuals and those with a personal or family history of BCC should protect their skin from sun exposure daily, year-round and lifelong.  Stay indoors or under the shade in the middle of the day   Wear covering clothing   Apply high protection factor SPF50+ broad-spectrum sunscreens generously to exposed skin if outdoors   Avoid indoor tanning (sun beds, solaria)      What is the outlook for SCC?  Most SCC are cured by treatment. Cure is most likely if treatment is undertaken when the lesion is small. A small percent of SCC's can spread to lymph  nodes and long term monitoring is indicated.   They are also at increased risk of other skin cancers, especially melanoma. Regular self-skin examinations and long-term annual skin checks by an  experienced health professional are recommended.     SEBORRHEIC KERATOSES     5 skin colored Sk's each approximately 3-4 mm on the right forehead. Informed insurance does not cover for removal schedule for cosmetic visit to remove.   Price for treatment provided $150 to treat up to 10 lesions, and if over 10 lesions, then additional $10/lesion thereafter.   - Relevant exam: Scattered over the trunk/extremities are waxy brown to black plaques and papules with stuck on appearance  - Exam and clinical history consistent with seborrheic keratoses  - Counseled that these are benign growths that do not require treatment  - Counseled that removal of lesions is considered cosmetic and so would incur a fee should patient elect to move forward.   - Patient to hold on treatments for now but will inform us should they desire additional treatments    MELANOCYTIC NEVI  -Relevant exam: Scattered over the trunk/extremities are homogenously pigmented brown macules and papules. ELM performed and without concerning findings.  - Exam and clinical history consistent with melanocytic nevi  - Educated on the ABCDE's of melanoma; handout provided  - Counseled to return to clinic prior to scheduled appointment should any of these lesions change or should any new lesions of concern arise  - Counseled on use of sun protection daily. Reviewed latest FDA sunscreen guidelines, including use of broad spectrum (UVA and UVB blocking) sunscreen or sun protective clothing with SPF 30-50 every 2-3 hours and reapplied after exposure to water; use of photoprotective clothing, including a broad brim hat and UPF rated clothing if outdoors for several hours; avoid use of tanning beds as these pose significant risk for melanoma and skin cancer.    LENTIGINES  OTHER SKIN CHANGES DUE TO CHRONIC EXPOSURE TO NONIONIZING RADIATION  - Relevant exam: Over sun exposed areas are brown macules. ELM performed and without concerning findings.  - Exam and clinical  history consistent with lentigines.  - Educated that these are indicative of prior sun exposure.   - Counseled to return to clinic prior to scheduled appointment should any of these lesions change or should any new lesions of concern arise.  - Recommended use of sunscreen as above and below.  - Counseled on use of sun protection daily. Reviewed latest FDA sunscreen guidelines, including use of broad spectrum (UVA and UVB blocking) sunscreen or sun protective clothing with SPF 30-50 every 2-3 hours and reapplied after exposure to water; use of photoprotective clothing, including a broad brim hat and UPF rated clothing if outdoors for several hours; avoid use of tanning beds as these pose significant risk for melanoma and skin cancer.    CHERRY ANGIOMAS  - Relevant exam: Scattered over the trunk/extremities are red papules  - Exam and clinical history consistent with cherry angiomas  - Educated that these are benign  - Educated that removal is considered aesthetic and would incur a fee.  - Patient does not wish to pursue removal at this time but will contact us should this change.    ACTINIC KERATOSES  - Relevant exam: On the lateral right cheek and right side of nose are gritty pink papules  - Exam and clinical history consistent with actinic keratoses  - Discussed that these lesions are considered premalignant with the potential to evolve into squamous cell carcinoma.   - Discussed that these are due to chronic sun exposure and therefore recommend use of sunscreen/sun protection to prevent further sun damage  - Discussed treatment options, including liquid nitrogen destruction, topical immunotherapy, and photodynamic therapy, including risks, benefits        PROCEDURE:  DESTRUCTION OF PRE-MALIGNANT LESIONS    - After a thorough discussion of treatment options and risk/benefits/alternatives (including but not limited to local pain, scarring, dyspigmentation, blistering, and possible superinfection), verbal and  written consent were obtained and the aforementioned lesions were treated on with cryotherapy using liquid nitrogen x 3 cycles for 5-10 seconds.    TOTAL NUMBER of 2 pre-malignant lesions were treated today on the ANATOMIC LOCATION: lateral right cheek and right side of nose.     The patient tolerated the procedure well, and after-care instructions were provided.     Scribe Attestation      I,:  Vesta Delgado MA am acting as a scribe while in the presence of the attending physician.:       I,:  Esthela Lopez MD personally performed the services described in this documentation    as scribed in my presence.:                   [1] No Known Allergies  [2]   Current Outpatient Medications:     aspirin (Aspirin 81) 81 mg EC tablet, Take 81 mg by mouth in the morning., Disp: , Rfl:     cetirizine (ZyrTEC) 10 mg tablet, Take 10 mg by mouth in the morning., Disp: , Rfl:     Cyanocobalamin (Vitamin B-12) 5000 MCG TBDP, Take by mouth, Disp: , Rfl:     diltiazem (CARDIZEM CD) 240 mg 24 hr capsule, Take 1 capsule by mouth in the morning, Disp: , Rfl:     omeprazole (PriLOSEC) 20 mg delayed release capsule, Take 1 capsule (20 mg total) by mouth daily, Disp: 90 capsule, Rfl: 1    rosuvastatin (CRESTOR) 5 mg tablet, Take 1 tablet (5 mg total) by mouth daily, Disp: 30 tablet, Rfl: 0

## 2025-06-25 ENCOUNTER — HOSPITAL ENCOUNTER (OUTPATIENT)
Dept: RADIOLOGY | Facility: HOSPITAL | Age: 80
Discharge: HOME | End: 2025-06-25
Attending: INTERNAL MEDICINE
Payer: MEDICARE

## 2025-06-25 DIAGNOSIS — N64.4 BREAST PAIN, RIGHT: ICD-10-CM

## 2025-06-25 DIAGNOSIS — R20.8 BURNING SENSATION: ICD-10-CM

## 2025-06-25 DIAGNOSIS — R92.8 ABNORMAL MAMMOGRAM: ICD-10-CM

## 2025-06-25 PROCEDURE — 77065 DX MAMMO INCL CAD UNI: CPT

## 2025-06-25 PROCEDURE — G0279 TOMOSYNTHESIS, MAMMO: HCPCS

## 2025-06-25 PROCEDURE — 76642 ULTRASOUND BREAST LIMITED: CPT

## 2025-07-01 DIAGNOSIS — K21.9 GASTROESOPHAGEAL REFLUX DISEASE WITHOUT ESOPHAGITIS: ICD-10-CM

## 2025-07-02 RX ORDER — OMEPRAZOLE 20 MG/1
20 CAPSULE, DELAYED RELEASE ORAL DAILY
Qty: 90 CAPSULE | Refills: 1 | Status: SHIPPED | OUTPATIENT
Start: 2025-07-02

## 2025-07-15 DIAGNOSIS — E78.2 MIXED HYPERLIPIDEMIA: ICD-10-CM

## 2025-07-15 RX ORDER — ROSUVASTATIN CALCIUM 5 MG/1
5 TABLET, COATED ORAL DAILY
Qty: 90 TABLET | Refills: 1 | Status: SHIPPED | OUTPATIENT
Start: 2025-07-15

## 2025-08-22 DIAGNOSIS — I10 PRIMARY HYPERTENSION: Primary | ICD-10-CM

## 2025-08-22 RX ORDER — DILTIAZEM HYDROCHLORIDE 240 MG/1
240 CAPSULE, COATED, EXTENDED RELEASE ORAL DAILY
Qty: 90 CAPSULE | Refills: 0 | Status: SHIPPED | OUTPATIENT
Start: 2025-08-22